# Patient Record
Sex: FEMALE | Race: WHITE | HISPANIC OR LATINO | Employment: FULL TIME | URBAN - METROPOLITAN AREA
[De-identification: names, ages, dates, MRNs, and addresses within clinical notes are randomized per-mention and may not be internally consistent; named-entity substitution may affect disease eponyms.]

---

## 2017-01-10 ENCOUNTER — ALLSCRIPTS OFFICE VISIT (OUTPATIENT)
Dept: OTHER | Facility: OTHER | Age: 42
End: 2017-01-10

## 2017-04-11 ENCOUNTER — ALLSCRIPTS OFFICE VISIT (OUTPATIENT)
Dept: OTHER | Facility: OTHER | Age: 42
End: 2017-04-11

## 2017-04-11 DIAGNOSIS — R10.2 PELVIC AND PERINEAL PAIN: ICD-10-CM

## 2017-04-18 ENCOUNTER — HOSPITAL ENCOUNTER (OUTPATIENT)
Dept: ULTRASOUND IMAGING | Facility: HOSPITAL | Age: 42
Discharge: HOME | End: 2017-04-18
Attending: OBSTETRICS & GYNECOLOGY
Payer: COMMERCIAL

## 2017-04-18 DIAGNOSIS — R10.2 PELVIC AND PERINEAL PAIN: ICD-10-CM

## 2017-04-18 PROCEDURE — 76856 US EXAM PELVIC COMPLETE: CPT

## 2017-04-18 PROCEDURE — 76830 TRANSVAGINAL US NON-OB: CPT

## 2017-04-25 ENCOUNTER — ALLSCRIPTS OFFICE VISIT (OUTPATIENT)
Dept: OTHER | Facility: OTHER | Age: 42
End: 2017-04-25

## 2017-06-09 ENCOUNTER — ANESTHESIA EVENT (OUTPATIENT)
Dept: PERIOP | Facility: HOSPITAL | Age: 42
End: 2017-06-09
Payer: COMMERCIAL

## 2017-06-09 RX ORDER — SODIUM CHLORIDE 9 MG/ML
125 INJECTION, SOLUTION INTRAVENOUS CONTINUOUS
Status: CANCELLED | OUTPATIENT
Start: 2017-06-20

## 2017-06-12 ENCOUNTER — APPOINTMENT (OUTPATIENT)
Dept: PREADMISSION TESTING | Facility: HOSPITAL | Age: 42
End: 2017-06-12
Payer: COMMERCIAL

## 2017-06-12 ENCOUNTER — APPOINTMENT (OUTPATIENT)
Dept: LAB | Facility: HOSPITAL | Age: 42
End: 2017-06-12
Attending: OBSTETRICS & GYNECOLOGY
Payer: COMMERCIAL

## 2017-06-12 ENCOUNTER — HOSPITAL ENCOUNTER (OUTPATIENT)
Dept: NON INVASIVE DIAGNOSTICS | Facility: HOSPITAL | Age: 42
Discharge: HOME | End: 2017-06-12
Attending: OBSTETRICS & GYNECOLOGY
Payer: COMMERCIAL

## 2017-06-12 ENCOUNTER — TRANSCRIBE ORDERS (OUTPATIENT)
Dept: ADMINISTRATIVE | Facility: HOSPITAL | Age: 42
End: 2017-06-12

## 2017-06-12 VITALS
HEART RATE: 89 BPM | DIASTOLIC BLOOD PRESSURE: 80 MMHG | BODY MASS INDEX: 31.54 KG/M2 | RESPIRATION RATE: 16 BRPM | SYSTOLIC BLOOD PRESSURE: 120 MMHG | HEIGHT: 63 IN | WEIGHT: 178 LBS | TEMPERATURE: 98.1 F

## 2017-06-12 DIAGNOSIS — Z01.818 PREOP EXAMINATION: Primary | ICD-10-CM

## 2017-06-12 DIAGNOSIS — N80.0 ENDOMETRIOSIS OF UTERUS: ICD-10-CM

## 2017-06-12 DIAGNOSIS — R10.2 ADNEXAL TENDERNESS, RIGHT: ICD-10-CM

## 2017-06-12 DIAGNOSIS — Z01.818 PREOP EXAMINATION: ICD-10-CM

## 2017-06-12 LAB
ABO GROUP BLD: NORMAL
ATRIAL RATE: 79 BPM
BASOPHILS # BLD AUTO: 0.12 THOUSANDS/ΜL (ref 0–0.1)
BASOPHILS NFR BLD AUTO: 1 % (ref 0–1)
BLD GP AB SCN SERPL QL: NEGATIVE
EOSINOPHIL # BLD AUTO: 0.23 THOUSAND/ΜL (ref 0–0.61)
EOSINOPHIL NFR BLD AUTO: 2 % (ref 0–6)
ERYTHROCYTE [DISTWIDTH] IN BLOOD BY AUTOMATED COUNT: 13.8 % (ref 11.6–15.1)
HCT VFR BLD AUTO: 41.3 % (ref 34.8–46.1)
HGB BLD-MCNC: 13.2 G/DL (ref 11.5–15.4)
LYMPHOCYTES # BLD AUTO: 2.41 THOUSANDS/ΜL (ref 0.6–4.47)
LYMPHOCYTES NFR BLD AUTO: 20 % (ref 14–44)
MCH RBC QN AUTO: 27.7 PG (ref 26.8–34.3)
MCHC RBC AUTO-ENTMCNC: 32 G/DL (ref 31.4–37.4)
MCV RBC AUTO: 87 FL (ref 82–98)
MONOCYTES # BLD AUTO: 0.95 THOUSAND/ΜL (ref 0.17–1.22)
MONOCYTES NFR BLD AUTO: 8 % (ref 4–12)
NEUTROPHILS # BLD AUTO: 8.25 THOUSANDS/ΜL (ref 1.85–7.62)
NEUTS SEG NFR BLD AUTO: 69 % (ref 43–75)
P AXIS: 49 DEGREES
PLATELET # BLD AUTO: 286 THOUSANDS/UL (ref 149–390)
PMV BLD AUTO: 12.4 FL (ref 8.9–12.7)
PR INTERVAL: 130 MS
QRS AXIS: 5 DEGREES
QRSD INTERVAL: 76 MS
QT INTERVAL: 374 MS
QTC INTERVAL: 428 MS
RBC # BLD AUTO: 4.76 MILLION/UL (ref 3.81–5.12)
RH BLD: POSITIVE
SPECIMEN EXPIRATION DATE: NORMAL
T WAVE AXIS: 2 DEGREES
VENTRICULAR RATE: 79 BPM
WBC # BLD AUTO: 11.96 THOUSAND/UL (ref 4.31–10.16)

## 2017-06-12 PROCEDURE — 86850 RBC ANTIBODY SCREEN: CPT

## 2017-06-12 PROCEDURE — 86900 BLOOD TYPING SEROLOGIC ABO: CPT

## 2017-06-12 PROCEDURE — 86901 BLOOD TYPING SEROLOGIC RH(D): CPT

## 2017-06-12 PROCEDURE — 93005 ELECTROCARDIOGRAM TRACING: CPT

## 2017-06-12 PROCEDURE — 85025 COMPLETE CBC W/AUTO DIFF WBC: CPT

## 2017-06-12 PROCEDURE — 36415 COLL VENOUS BLD VENIPUNCTURE: CPT

## 2017-06-12 RX ORDER — ASPIRIN 81 MG/1
81 TABLET ORAL DAILY
COMMUNITY
End: 2017-06-21 | Stop reason: HOSPADM

## 2017-06-12 RX ORDER — LOSARTAN POTASSIUM 25 MG/1
25 TABLET ORAL EVERY EVENING
COMMUNITY
End: 2021-06-21 | Stop reason: CLARIF

## 2017-06-12 RX ORDER — GLIPIZIDE 5 MG/1
5 TABLET ORAL DAILY
COMMUNITY
End: 2021-03-29 | Stop reason: ALTCHOICE

## 2017-06-20 ENCOUNTER — HOSPITAL ENCOUNTER (OUTPATIENT)
Facility: HOSPITAL | Age: 42
Setting detail: OUTPATIENT SURGERY
Discharge: HOME | End: 2017-06-21
Attending: OBSTETRICS & GYNECOLOGY | Admitting: OBSTETRICS & GYNECOLOGY
Payer: COMMERCIAL

## 2017-06-20 ENCOUNTER — ANESTHESIA (OUTPATIENT)
Dept: PERIOP | Facility: HOSPITAL | Age: 42
End: 2017-06-20
Payer: COMMERCIAL

## 2017-06-20 DIAGNOSIS — N80.0 ENDOMETRIOSIS OF UTERUS: ICD-10-CM

## 2017-06-20 DIAGNOSIS — R10.2 PELVIC AND PERINEAL PAIN: ICD-10-CM

## 2017-06-20 PROBLEM — Z90.710 S/P LAPAROSCOPIC HYSTERECTOMY: Status: ACTIVE | Noted: 2017-06-20

## 2017-06-20 LAB
EXT PREGNANCY TEST URINE: NEGATIVE
GLUCOSE SERPL-MCNC: 154 MG/DL (ref 65–140)
GLUCOSE SERPL-MCNC: 200 MG/DL (ref 65–140)

## 2017-06-20 PROCEDURE — 81025 URINE PREGNANCY TEST: CPT | Performed by: ANESTHESIOLOGY

## 2017-06-20 PROCEDURE — 88307 TISSUE EXAM BY PATHOLOGIST: CPT | Performed by: OBSTETRICS & GYNECOLOGY

## 2017-06-20 PROCEDURE — 82948 REAGENT STRIP/BLOOD GLUCOSE: CPT

## 2017-06-20 RX ORDER — SODIUM CHLORIDE, SODIUM LACTATE, POTASSIUM CHLORIDE, CALCIUM CHLORIDE 600; 310; 30; 20 MG/100ML; MG/100ML; MG/100ML; MG/100ML
125 INJECTION, SOLUTION INTRAVENOUS CONTINUOUS
Status: DISCONTINUED | OUTPATIENT
Start: 2017-06-20 | End: 2017-06-20 | Stop reason: HOSPADM

## 2017-06-20 RX ORDER — ONDANSETRON 2 MG/ML
4 INJECTION INTRAMUSCULAR; INTRAVENOUS ONCE AS NEEDED
Status: DISCONTINUED | OUTPATIENT
Start: 2017-06-20 | End: 2017-06-20 | Stop reason: HOSPADM

## 2017-06-20 RX ORDER — ACETAMINOPHEN 325 MG/1
650 TABLET ORAL EVERY 4 HOURS PRN
Status: DISCONTINUED | OUTPATIENT
Start: 2017-06-20 | End: 2017-06-21 | Stop reason: HOSPADM

## 2017-06-20 RX ORDER — SODIUM CHLORIDE 9 MG/ML
125 INJECTION, SOLUTION INTRAVENOUS CONTINUOUS
Status: DISCONTINUED | OUTPATIENT
Start: 2017-06-20 | End: 2017-06-20 | Stop reason: HOSPADM

## 2017-06-20 RX ORDER — ONDANSETRON 2 MG/ML
INJECTION INTRAMUSCULAR; INTRAVENOUS AS NEEDED
Status: DISCONTINUED | OUTPATIENT
Start: 2017-06-20 | End: 2017-06-20 | Stop reason: SURG

## 2017-06-20 RX ORDER — LOSARTAN POTASSIUM 50 MG/1
25 TABLET ORAL EVERY EVENING
Status: DISCONTINUED | OUTPATIENT
Start: 2017-06-20 | End: 2017-06-21 | Stop reason: HOSPADM

## 2017-06-20 RX ORDER — ROCURONIUM BROMIDE 10 MG/ML
INJECTION, SOLUTION INTRAVENOUS AS NEEDED
Status: DISCONTINUED | OUTPATIENT
Start: 2017-06-20 | End: 2017-06-20 | Stop reason: SURG

## 2017-06-20 RX ORDER — BUPIVACAINE HYDROCHLORIDE 5 MG/ML
INJECTION, SOLUTION PERINEURAL AS NEEDED
Status: DISCONTINUED | OUTPATIENT
Start: 2017-06-20 | End: 2017-06-20 | Stop reason: HOSPADM

## 2017-06-20 RX ORDER — MIDAZOLAM HYDROCHLORIDE 1 MG/ML
INJECTION INTRAMUSCULAR; INTRAVENOUS AS NEEDED
Status: DISCONTINUED | OUTPATIENT
Start: 2017-06-20 | End: 2017-06-20 | Stop reason: SURG

## 2017-06-20 RX ORDER — ASPIRIN 81 MG/1
81 TABLET ORAL DAILY
Status: DISCONTINUED | OUTPATIENT
Start: 2017-06-21 | End: 2017-06-21 | Stop reason: HOSPADM

## 2017-06-20 RX ORDER — IBUPROFEN 600 MG/1
600 TABLET ORAL EVERY 6 HOURS PRN
Status: DISCONTINUED | OUTPATIENT
Start: 2017-06-20 | End: 2017-06-21 | Stop reason: HOSPADM

## 2017-06-20 RX ORDER — FENTANYL CITRATE 50 UG/ML
INJECTION, SOLUTION INTRAMUSCULAR; INTRAVENOUS AS NEEDED
Status: DISCONTINUED | OUTPATIENT
Start: 2017-06-20 | End: 2017-06-20 | Stop reason: SURG

## 2017-06-20 RX ORDER — DOCUSATE SODIUM 100 MG/1
100 CAPSULE, LIQUID FILLED ORAL 2 TIMES DAILY
Status: DISCONTINUED | OUTPATIENT
Start: 2017-06-20 | End: 2017-06-21 | Stop reason: HOSPADM

## 2017-06-20 RX ORDER — PROPOFOL 10 MG/ML
INJECTION, EMULSION INTRAVENOUS AS NEEDED
Status: DISCONTINUED | OUTPATIENT
Start: 2017-06-20 | End: 2017-06-20 | Stop reason: SURG

## 2017-06-20 RX ORDER — ONDANSETRON 2 MG/ML
4 INJECTION INTRAMUSCULAR; INTRAVENOUS EVERY 6 HOURS PRN
Status: DISCONTINUED | OUTPATIENT
Start: 2017-06-20 | End: 2017-06-21 | Stop reason: HOSPADM

## 2017-06-20 RX ORDER — HYDROMORPHONE HYDROCHLORIDE 2 MG/ML
INJECTION, SOLUTION INTRAMUSCULAR; INTRAVENOUS; SUBCUTANEOUS AS NEEDED
Status: DISCONTINUED | OUTPATIENT
Start: 2017-06-20 | End: 2017-06-20 | Stop reason: SURG

## 2017-06-20 RX ORDER — OXYCODONE HYDROCHLORIDE AND ACETAMINOPHEN 5; 325 MG/1; MG/1
1 TABLET ORAL EVERY 4 HOURS PRN
Status: DISCONTINUED | OUTPATIENT
Start: 2017-06-20 | End: 2017-06-21 | Stop reason: HOSPADM

## 2017-06-20 RX ORDER — MAGNESIUM HYDROXIDE 1200 MG/15ML
LIQUID ORAL AS NEEDED
Status: DISCONTINUED | OUTPATIENT
Start: 2017-06-20 | End: 2017-06-20 | Stop reason: HOSPADM

## 2017-06-20 RX ORDER — GLIPIZIDE 5 MG/1
5 TABLET ORAL DAILY
Status: DISCONTINUED | OUTPATIENT
Start: 2017-06-21 | End: 2017-06-21 | Stop reason: HOSPADM

## 2017-06-20 RX ORDER — OXYCODONE HYDROCHLORIDE AND ACETAMINOPHEN 5; 325 MG/1; MG/1
2 TABLET ORAL EVERY 4 HOURS PRN
Status: DISCONTINUED | OUTPATIENT
Start: 2017-06-20 | End: 2017-06-21 | Stop reason: HOSPADM

## 2017-06-20 RX ORDER — SODIUM CHLORIDE 9 MG/ML
125 INJECTION, SOLUTION INTRAVENOUS CONTINUOUS
Status: DISCONTINUED | OUTPATIENT
Start: 2017-06-20 | End: 2017-06-21 | Stop reason: HOSPADM

## 2017-06-20 RX ADMIN — FENTANYL CITRATE 50 MCG: 50 INJECTION, SOLUTION INTRAMUSCULAR; INTRAVENOUS at 14:00

## 2017-06-20 RX ADMIN — ROCURONIUM BROMIDE 50 MG: 10 INJECTION, SOLUTION INTRAVENOUS at 12:28

## 2017-06-20 RX ADMIN — INSULIN LISPRO 1 UNITS: 100 INJECTION, SOLUTION INTRAVENOUS; SUBCUTANEOUS at 17:51

## 2017-06-20 RX ADMIN — ONDANSETRON HYDROCHLORIDE 4 MG: 2 INJECTION, SOLUTION INTRAVENOUS at 12:52

## 2017-06-20 RX ADMIN — CEFAZOLIN SODIUM 2000 MG: 2 SOLUTION INTRAVENOUS at 12:37

## 2017-06-20 RX ADMIN — METHYLENE BLUE 5 MG: 5 INJECTION INTRAVENOUS at 14:52

## 2017-06-20 RX ADMIN — HYDROMORPHONE HYDROCHLORIDE 0.5 MG: 2 INJECTION, SOLUTION INTRAMUSCULAR; INTRAVENOUS; SUBCUTANEOUS at 13:10

## 2017-06-20 RX ADMIN — FENTANYL CITRATE 50 MCG: 50 INJECTION, SOLUTION INTRAMUSCULAR; INTRAVENOUS at 12:52

## 2017-06-20 RX ADMIN — ONDANSETRON HYDROCHLORIDE 4 MG: 2 INJECTION, SOLUTION INTRAVENOUS at 13:19

## 2017-06-20 RX ADMIN — FENTANYL CITRATE 50 MCG: 50 INJECTION, SOLUTION INTRAMUSCULAR; INTRAVENOUS at 15:10

## 2017-06-20 RX ADMIN — FENTANYL CITRATE 50 MCG: 50 INJECTION, SOLUTION INTRAMUSCULAR; INTRAVENOUS at 13:00

## 2017-06-20 RX ADMIN — DOCUSATE SODIUM 100 MG: 100 CAPSULE, LIQUID FILLED ORAL at 17:51

## 2017-06-20 RX ADMIN — ONDANSETRON 4 MG: 2 INJECTION INTRAMUSCULAR; INTRAVENOUS at 17:51

## 2017-06-20 RX ADMIN — DEXAMETHASONE SODIUM PHOSPHATE 4 MG: 10 INJECTION INTRAMUSCULAR; INTRAVENOUS at 12:52

## 2017-06-20 RX ADMIN — DEXAMETHASONE SODIUM PHOSPHATE 4 MG: 10 INJECTION INTRAMUSCULAR; INTRAVENOUS at 13:06

## 2017-06-20 RX ADMIN — SODIUM CHLORIDE 125 ML/HR: 0.9 INJECTION, SOLUTION INTRAVENOUS at 16:41

## 2017-06-20 RX ADMIN — HYDROMORPHONE HYDROCHLORIDE 0.5 MG: 2 INJECTION, SOLUTION INTRAMUSCULAR; INTRAVENOUS; SUBCUTANEOUS at 13:04

## 2017-06-20 RX ADMIN — HYDROMORPHONE HYDROCHLORIDE 0.5 MG: 2 INJECTION, SOLUTION INTRAMUSCULAR; INTRAVENOUS; SUBCUTANEOUS at 13:42

## 2017-06-20 RX ADMIN — SODIUM CHLORIDE 125 ML/HR: 0.9 INJECTION, SOLUTION INTRAVENOUS at 11:58

## 2017-06-20 RX ADMIN — HYDROMORPHONE HYDROCHLORIDE 0.5 MG: 2 INJECTION, SOLUTION INTRAMUSCULAR; INTRAVENOUS; SUBCUTANEOUS at 13:23

## 2017-06-20 RX ADMIN — FENTANYL CITRATE 100 MCG: 50 INJECTION, SOLUTION INTRAMUSCULAR; INTRAVENOUS at 12:28

## 2017-06-20 RX ADMIN — OXYCODONE HYDROCHLORIDE AND ACETAMINOPHEN 2 TABLET: 5; 325 TABLET ORAL at 17:51

## 2017-06-20 RX ADMIN — MIDAZOLAM HYDROCHLORIDE 2 MG: 1 INJECTION, SOLUTION INTRAMUSCULAR; INTRAVENOUS at 12:20

## 2017-06-20 RX ADMIN — PROPOFOL 200 MG: 10 INJECTION, EMULSION INTRAVENOUS at 12:28

## 2017-06-20 RX ADMIN — LOSARTAN POTASSIUM 25 MG: 50 TABLET, FILM COATED ORAL at 17:50

## 2017-06-21 VITALS
OXYGEN SATURATION: 99 % | BODY MASS INDEX: 31.54 KG/M2 | HEART RATE: 80 BPM | RESPIRATION RATE: 16 BRPM | HEIGHT: 63 IN | SYSTOLIC BLOOD PRESSURE: 101 MMHG | DIASTOLIC BLOOD PRESSURE: 68 MMHG | WEIGHT: 178 LBS | TEMPERATURE: 98.9 F

## 2017-06-21 LAB
ERYTHROCYTE [DISTWIDTH] IN BLOOD BY AUTOMATED COUNT: 13.6 % (ref 11.6–15.1)
GLUCOSE SERPL-MCNC: 159 MG/DL (ref 65–140)
GLUCOSE SERPL-MCNC: 196 MG/DL (ref 65–140)
HCT VFR BLD AUTO: 39 % (ref 34.8–46.1)
HGB BLD-MCNC: 12.8 G/DL (ref 11.5–15.4)
MCH RBC QN AUTO: 28.6 PG (ref 26.8–34.3)
MCHC RBC AUTO-ENTMCNC: 32.8 G/DL (ref 31.4–37.4)
MCV RBC AUTO: 87 FL (ref 82–98)
PLATELET # BLD AUTO: 318 THOUSANDS/UL (ref 149–390)
PMV BLD AUTO: 12 FL (ref 8.9–12.7)
RBC # BLD AUTO: 4.48 MILLION/UL (ref 3.81–5.12)
WBC # BLD AUTO: 17.05 THOUSAND/UL (ref 4.31–10.16)

## 2017-06-21 PROCEDURE — 82948 REAGENT STRIP/BLOOD GLUCOSE: CPT

## 2017-06-21 PROCEDURE — 85027 COMPLETE CBC AUTOMATED: CPT | Performed by: OBSTETRICS & GYNECOLOGY

## 2017-06-21 RX ORDER — OXYCODONE HYDROCHLORIDE AND ACETAMINOPHEN 5; 325 MG/1; MG/1
1 TABLET ORAL EVERY 4 HOURS PRN
Refills: 0
Start: 2017-06-21 | End: 2017-07-01

## 2017-06-21 RX ORDER — IBUPROFEN 600 MG/1
600 TABLET ORAL EVERY 6 HOURS PRN
Qty: 30 TABLET | Refills: 0
Start: 2017-06-21

## 2017-06-21 RX ADMIN — GLIPIZIDE 5 MG: 5 TABLET ORAL at 09:05

## 2017-06-21 RX ADMIN — INSULIN LISPRO 1 UNITS: 100 INJECTION, SOLUTION INTRAVENOUS; SUBCUTANEOUS at 12:27

## 2017-06-21 RX ADMIN — ACETAMINOPHEN 650 MG: 325 TABLET, FILM COATED ORAL at 00:39

## 2017-06-21 RX ADMIN — OXYCODONE HYDROCHLORIDE AND ACETAMINOPHEN 2 TABLET: 5; 325 TABLET ORAL at 09:05

## 2017-06-21 RX ADMIN — SODIUM CHLORIDE 125 ML/HR: 0.9 INJECTION, SOLUTION INTRAVENOUS at 00:34

## 2017-06-21 RX ADMIN — METFORMIN HYDROCHLORIDE 1000 MG: 500 TABLET, FILM COATED ORAL at 09:05

## 2017-06-21 RX ADMIN — ASPIRIN 81 MG: 81 TABLET, COATED ORAL at 09:05

## 2017-06-21 RX ADMIN — IBUPROFEN 600 MG: 600 TABLET, FILM COATED ORAL at 12:27

## 2017-06-21 RX ADMIN — INSULIN LISPRO 1 UNITS: 100 INJECTION, SOLUTION INTRAVENOUS; SUBCUTANEOUS at 07:21

## 2017-06-21 RX ADMIN — DOCUSATE SODIUM 100 MG: 100 CAPSULE, LIQUID FILLED ORAL at 09:05

## 2017-07-03 ENCOUNTER — ALLSCRIPTS OFFICE VISIT (OUTPATIENT)
Dept: OTHER | Facility: OTHER | Age: 42
End: 2017-07-03

## 2017-07-26 ENCOUNTER — ALLSCRIPTS OFFICE VISIT (OUTPATIENT)
Dept: OTHER | Facility: OTHER | Age: 42
End: 2017-07-26

## 2017-08-09 ENCOUNTER — ALLSCRIPTS OFFICE VISIT (OUTPATIENT)
Dept: OTHER | Facility: OTHER | Age: 42
End: 2017-08-09

## 2017-08-22 ENCOUNTER — ALLSCRIPTS OFFICE VISIT (OUTPATIENT)
Dept: OTHER | Facility: OTHER | Age: 42
End: 2017-08-22

## 2017-09-11 ENCOUNTER — GENERIC CONVERSION - ENCOUNTER (OUTPATIENT)
Dept: OTHER | Facility: OTHER | Age: 42
End: 2017-09-11

## 2017-09-11 DIAGNOSIS — Z12.31 ENCOUNTER FOR SCREENING MAMMOGRAM FOR MALIGNANT NEOPLASM OF BREAST: ICD-10-CM

## 2017-11-03 ENCOUNTER — HOSPITAL ENCOUNTER (OUTPATIENT)
Dept: MAMMOGRAPHY | Facility: HOSPITAL | Age: 42
Discharge: HOME | End: 2017-11-03
Attending: OBSTETRICS & GYNECOLOGY
Payer: COMMERCIAL

## 2017-11-03 DIAGNOSIS — Z12.31 ENCOUNTER FOR SCREENING MAMMOGRAM FOR MALIGNANT NEOPLASM OF BREAST: ICD-10-CM

## 2017-11-03 PROCEDURE — G0202 SCR MAMMO BI INCL CAD: HCPCS

## 2018-01-12 VITALS
HEIGHT: 63 IN | SYSTOLIC BLOOD PRESSURE: 117 MMHG | WEIGHT: 175 LBS | DIASTOLIC BLOOD PRESSURE: 75 MMHG | BODY MASS INDEX: 31.01 KG/M2

## 2018-01-14 VITALS
DIASTOLIC BLOOD PRESSURE: 70 MMHG | WEIGHT: 179 LBS | BODY MASS INDEX: 31.71 KG/M2 | SYSTOLIC BLOOD PRESSURE: 117 MMHG | HEIGHT: 63 IN

## 2018-01-14 VITALS
BODY MASS INDEX: 31.71 KG/M2 | SYSTOLIC BLOOD PRESSURE: 117 MMHG | DIASTOLIC BLOOD PRESSURE: 70 MMHG | WEIGHT: 179 LBS | HEIGHT: 63 IN

## 2018-01-14 VITALS
BODY MASS INDEX: 31.76 KG/M2 | SYSTOLIC BLOOD PRESSURE: 120 MMHG | WEIGHT: 179.25 LBS | HEIGHT: 63 IN | DIASTOLIC BLOOD PRESSURE: 88 MMHG

## 2018-01-14 VITALS
SYSTOLIC BLOOD PRESSURE: 112 MMHG | DIASTOLIC BLOOD PRESSURE: 70 MMHG | WEIGHT: 179 LBS | HEIGHT: 63 IN | BODY MASS INDEX: 31.71 KG/M2

## 2018-01-15 VITALS
BODY MASS INDEX: 31.36 KG/M2 | DIASTOLIC BLOOD PRESSURE: 72 MMHG | SYSTOLIC BLOOD PRESSURE: 110 MMHG | WEIGHT: 177 LBS | HEIGHT: 63 IN

## 2018-01-15 VITALS
SYSTOLIC BLOOD PRESSURE: 116 MMHG | HEIGHT: 63 IN | WEIGHT: 177.38 LBS | DIASTOLIC BLOOD PRESSURE: 68 MMHG | BODY MASS INDEX: 31.43 KG/M2

## 2018-01-22 VITALS
HEIGHT: 63 IN | BODY MASS INDEX: 31.43 KG/M2 | SYSTOLIC BLOOD PRESSURE: 124 MMHG | DIASTOLIC BLOOD PRESSURE: 74 MMHG | WEIGHT: 177.38 LBS

## 2018-09-20 ENCOUNTER — ANNUAL EXAM (OUTPATIENT)
Dept: OBGYN CLINIC | Facility: CLINIC | Age: 43
End: 2018-09-20
Payer: COMMERCIAL

## 2018-09-20 VITALS
HEIGHT: 63 IN | BODY MASS INDEX: 29.66 KG/M2 | SYSTOLIC BLOOD PRESSURE: 120 MMHG | WEIGHT: 167.4 LBS | DIASTOLIC BLOOD PRESSURE: 80 MMHG

## 2018-09-20 DIAGNOSIS — Z01.411 ENCNTR FOR GYN EXAM (GENERAL) (ROUTINE) W ABNORMAL FINDINGS: Primary | ICD-10-CM

## 2018-09-20 DIAGNOSIS — Z12.31 ENCOUNTER FOR SCREENING MAMMOGRAM FOR BREAST CANCER: ICD-10-CM

## 2018-09-20 PROCEDURE — S0612 ANNUAL GYNECOLOGICAL EXAMINA: HCPCS | Performed by: OBSTETRICS & GYNECOLOGY

## 2018-09-20 RX ORDER — INSULIN GLARGINE 100 [IU]/ML
20 INJECTION, SOLUTION SUBCUTANEOUS DAILY
COMMUNITY
Start: 2018-08-08 | End: 2021-08-18 | Stop reason: SDUPTHER

## 2018-09-20 NOTE — PATIENT INSTRUCTIONS
Breast Self Exam for Women   WHAT YOU NEED TO KNOW:   A BSE is a way to check your breasts for lumps and other changes  Regular BSEs can help you know how your breasts normally look and feel  Most breast lumps or changes are not cancer, but you should always have them checked by a healthcare provider  Your healthcare provider can also watch you do a BSE and can tell you if you are doing your BSE correctly  DISCHARGE INSTRUCTIONS:   Contact your healthcare provider if:   · You find any lumps or changes in your breasts  · You have breast pain or fluid coming from your nipples  · You have questions or concerns about your condition or care  Why you should do a BSE:  Breast cancer is the most common type of cancer in women  Even if you have mammograms, you may still want to do a BSE regularly  If you know how your breasts normally feel and look, it may help you know when to contact your healthcare provider  Mammograms can miss some cancers  You may find a lump during a BSE that did not show up on your mammogram   When you should do a BSE:  [employer] Fails your calendar to help you remember to do BSE on a regular schedule  One easy way to remember to do a BSE is to do the exam on the same day of each month  If you have periods, you may want to do your BSE 1 week after your period ends  This is the time when your breasts may be the least swollen, lumpy, or tender  You can do regular BSEs even if you are breastfeeding or have breast implants  How to do a BSE:   · Look at your breasts in a mirror  Look at the size and shape of each breast and nipple  Check for swelling, lumps, dimpling, scaly skin, or other skin changes  Look for nipple changes, such as a nipple that is painful or beginning to pull inward  Gently squeeze both nipples and check to see if fluid (that is not breast milk) comes out of them  If you find any of these or other breast changes, contact your healthcare provider   Check your breasts while you sit or  the following 3 positions:    [facility] your arms down at your sides  ¨ Raise your hands and join them behind your head  ¨ Put firm pressure with your hands on your hips  Bend slightly forward while you look at your breasts in the mirror  · Lie down and feel your breasts  When you lie down, your breast tissue spreads out evenly over your chest  This makes it easier for you to feel for lumps and anything that may not be normal for your breasts  Do a BSE on one breast at a time  ¨ Place a small pillow or towel under your left shoulder  Put your left arm behind your head  ¨ Use the 3 middle fingers of your right hand  Use your fingertip pads, on the top of your fingers  Your fingertip pad is the most sensitive part of your finger  ¨ Use small circles to feel your breast tissue  Use your fingertip pads to make dime-sized, overlapping circles on your breast and armpits  Use light, medium, and firm pressure  First, press lightly  Second, press with medium pressure to feel a little deeper into the breast  Last, use firm pressure to feel deep within your breast     ¨ Examine your entire breast area  Examine the breast area from above the breast to below the breast where you feel only ribs  Make small circles with your fingertips, starting in the middle of your armpit  Make circles going up and down the breast area  Continue toward your breast and all the way across it  Examine the area from your armpit all the way over to the middle of your chest (breastbone)  Stop at the middle of your chest     ¨ Move the pillow or towel to your right shoulder, and put your right arm behind your head  Use the 3 fingertip pads of your left hand, and repeat the above steps to do a BSE on your right breast        What else you can do to check for breast problems or cancer:  Some experts suggest that women 36years of age or older should have a mammogram every year   Other experts suggest that women between the ages of 48and 76years old should have a mammogram every 2 years  Talk to your healthcare provider about when you should have a mammogram   Follow up with your healthcare provider as directed: Your healthcare provider can watch you and tell you if you are doing your BSE correctly  Write down your questions so you remember to ask them during your visits  © 2017 [employer] Information is for End User's use only and may not be sold, redistributed or otherwise used for commercial purposes  All illustrations and images included in CareNotes® are the copyrighted property of A D A M , Inc  or Leonel Dean  The above information is an  only  It is not intended as medical advice for individual conditions or treatments  Talk to your doctor, nurse or pharmacist before following any medical regimen to see if it is safe and effective for you  Wellness Visit for Adults   AMBULATORY CARE:   A wellness visit  is when you see your healthcare provider to get screened for health problems  You can also get advice on how to stay healthy  Write down your questions so you remember to ask them  Ask your healthcare provider how often you should have a wellness visit  What happens at a wellness visit:  Your healthcare provider will ask about your health, and your family history of health problems  This includes high blood pressure, heart disease, and cancer  He or she will ask if you have symptoms that concern you, if you smoke, and about your mood  You may also be asked about your intake of medicines, supplements, food, and alcohol  Any of the following may be done:  · Your weight  will be checked  Your height may also be checked so your body mass index (BMI) can be calculated  Your BMI shows if you are at a healthy weight  · Your blood pressure  and heart rate will be checked  Your temperature may also be checked  · Blood and urine tests  may be done   Blood tests may be done to check your cholesterol levels  Abnormal cholesterol levels increase your risk for heart disease and stroke  You may also need a blood or urine test to check for diabetes if you are at increased risk  Urine tests may be done to look for signs of an infection or kidney disease  · A physical exam  includes checking your heartbeat and lungs with a stethoscope  Your healthcare provider may also check your skin to look for sun damage  · Screening tests  may be recommended  A screening test is done to check for diseases that may not cause symptoms  The screening tests you may need depend on your age, gender, family history, and lifestyle habits  For example, colorectal screening may be recommended if you are 48years old or older  Screening tests you need if you are a woman:   · A Pap smear  is used to screen for cervical cancer  Pap smears are usually done every 3 to 5 years depending on your age  You may need them more often if you have had abnormal Pap smear test results in the past  Ask your healthcare provider how often you should have a Pap smear  · A mammogram  is an x-ray of your breasts to screen for breast cancer  Experts recommend mammograms every 2 years starting at age 48 years  You may need a mammogram at age 52 years or younger if you have an increased risk for breast cancer  Talk to your healthcare provider about when you should start having mammograms and how often you need them  Vaccines you may need:   · Get an influenza vaccine  every year  The influenza vaccine protects you from the flu  Several types of viruses cause the flu  The viruses change over time, so new vaccines are made each year  · Get a tetanus-diphtheria (Td) booster vaccine  every 10 years  This vaccine protects you against tetanus and diphtheria  Tetanus is a severe infection that may cause painful muscle spasms and lockjaw   Diphtheria is a severe bacterial infection that causes a thick covering in the back of your mouth and throat  · Get a human papillomavirus (HPV) vaccine  if you are female and aged 23 to 32 or male 23 to 24 and never received it  This vaccine protects you from HPV infection  HPV is the most common infection spread by sexual contact  HPV may also cause vaginal, penile, and anal cancers  · Get a pneumococcal vaccine  if you are aged 72 years or older  The pneumococcal vaccine is an injection given to protect you from pneumococcal disease  Pneumococcal disease is an infection caused by pneumococcal bacteria  The infection may cause pneumonia, meningitis, or an ear infection  · Get a shingles vaccine  if you are aged 61 or older, even if you have had shingles before  The shingles vaccine is an injection to protect you from the varicella-zoster virus  This is the same virus that causes chickenpox  Shingles is a painful rash that develops in people who had chickenpox or have been exposed to the virus  How to eat healthy:  My Plate is a model for planning healthy meals  It shows the types and amounts of foods that should go on your plate  Fruits and vegetables make up about half of your plate, and grains and protein make up the other half  A serving of dairy is included on the side of your plate  The amount of calories and serving sizes you need depends on your age, gender, weight, and height  Examples of healthy foods are listed below:  · Eat a variety of vegetables  such as dark green, red, and orange vegetables  You can also include canned vegetables low in sodium (salt) and frozen vegetables without added butter or sauces  · Eat a variety of fresh fruits , canned fruit in 100% juice, frozen fruit, and dried fruit  · Include whole grains  At least half of the grains you eat should be whole grains   Examples include whole-wheat bread, wheat pasta, brown rice, and whole-grain cereals such as oatmeal     · Eat a variety of protein foods such as seafood (fish and shellfish), lean meat, and poultry without skin (turkey and chicken)  Examples of lean meats include pork leg, shoulder, or tenderloin, and beef round, sirloin, tenderloin, and extra lean ground beef  Other protein foods include eggs and egg substitutes, beans, peas, soy products, nuts, and seeds  · Choose low-fat dairy products such as skim or 1% milk or low-fat yogurt, cheese, and cottage cheese  · Limit unhealthy fats  such as butter, hard margarine, and shortening  Exercise:  Exercise at least 30 minutes per day on most days of the week  Some examples of exercise include walking, biking, dancing, and swimming  You can also fit in more physical activity by taking the stairs instead of the elevator or parking farther away from stores  Include muscle strengthening activities 2 days each week  Regular exercise provides many health benefits  It helps you manage your weight, and decreases your risk for type 2 diabetes, heart disease, stroke, and high blood pressure  Exercise can also help improve your mood  Ask your healthcare provider about the best exercise plan for you  General health and safety guidelines:   · Do not smoke  Nicotine and other chemicals in cigarettes and cigars can cause lung damage  Ask your healthcare provider for information if you currently smoke and need help to quit  E-cigarettes or smokeless tobacco still contain nicotine  Talk to your healthcare provider before you use these products  · Limit alcohol  A drink of alcohol is 12 ounces of beer, 5 ounces of wine, or 1½ ounces of liquor  · Lose weight, if needed  Being overweight increases your risk of certain health conditions  These include heart disease, high blood pressure, type 2 diabetes, and certain types of cancer  · Protect your skin  Do not sunbathe or use tanning beds  Use sunscreen with a SPF 15 or higher  Apply sunscreen at least 15 minutes before you go outside  Reapply sunscreen every 2 hours   Wear protective clothing, hats, and sunglasses when you are outside  · Drive safely  Always wear your seatbelt  Make sure everyone in your car wears a seatbelt  A seatbelt can save your life if you are in an accident  Do not use your cell phone when you are driving  This could distract you and cause an accident  Pull over if you need to make a call or send a text message  · Practice safe sex  Use latex condoms if are sexually active and have more than one partner  Your healthcare provider may recommend screening tests for sexually transmitted infections (STIs)  · Wear helmets, lifejackets, and protective gear  Always wear a helmet when you ride a bike or motorcycle, go skiing, or play sports that could cause a head injury  Wear protective equipment when you play sports  Wear a lifejacket when you are on a boat or doing water sports  © 2017 [employer]  Information is for End User's use only and may not be sold, redistributed or otherwise used for commercial purposes  All illustrations and images included in CareNotes® are the copyrighted property of A [employer] A M , Inc  or Leonel Dean  The above information is an  only  It is not intended as medical advice for individual conditions or treatments  Talk to your doctor, nurse or pharmacist before following any medical regimen to see if it is safe and effective for you

## 2018-09-20 NOTE — PROGRESS NOTES
Assessment        Diagnoses and all orders for this visit:    Encntr for gyn exam (general) (routine) w abnormal findings    Encounter for screening mammogram for breast cancer  -     Mammo screening bilateral w cad; Future    Other orders  -     LANTUS SOLOSTAR 100 units/mL injection pen;                   Plan      Breast self exam technique reviewed and patient encouraged to perform self-exam monthly  Discussed healthy lifestyle modifications  Educational material distributed  Follow up in 1 year  Follow up as needed  Mammogram   PAP not indicated       Subjective      Jamel Salazar is a 37 y o  female who presents for annual exam     Patient with history of total laparoscopic hysterectomy and bilateral salpingectomy in 2017  Pathology was normal with no dysplasia or hyperplasia  This was done for abnormal uterine bleeding  Patient has no complaints today  Her last mammogram was done in 2017  Current contraception: tubal ligation  History of abnormal Pap smear: no      Menstrual History:  OB History      Para Term  AB Living    6 6 6 0 0 6    SAB TAB Ectopic Multiple Live Births    0 0 0 0 0           Patient's last menstrual period was 2016  The following portions of the patient's history were reviewed and updated as appropriate: allergies, current medications, past family history, past medical history, past social history, past surgical history and problem list         Review of Systems   Constitutional: Negative  HENT: Negative  Eyes: Negative  Respiratory: Negative  Cardiovascular: Negative  Gastrointestinal: Negative  Endocrine: Negative  Genitourinary:        As noted in HPI   Musculoskeletal: Negative  Skin: Negative  Allergic/Immunologic: Negative  Neurological: Negative  Hematological: Negative  Psychiatric/Behavioral: Negative  Physical Exam   Constitutional: She is oriented to person, place, and time  She appears well-developed and well-nourished  Genitourinary: There is no rash or lesion on the right labia  There is no rash or lesion on the left labia  No bleeding in the vagina  No vaginal discharge found  Right adnexum does not display mass, does not display tenderness and does not display fullness  Left adnexum does not display mass, does not display tenderness and does not display fullness  HENT:   Head: Normocephalic  Neck: No thyromegaly present  Cardiovascular: Normal rate, regular rhythm and normal heart sounds  No murmur heard  Pulmonary/Chest: Effort normal and breath sounds normal  No respiratory distress  She has no wheezes  She has no rales  Right breast exhibits no mass, no nipple discharge, no skin change and no tenderness  Left breast exhibits no mass, no nipple discharge, no skin change and no tenderness  Abdominal: Soft  She exhibits no distension and no mass  There is no tenderness  There is no rebound and no guarding  Musculoskeletal: She exhibits no edema or tenderness  Neurological: She is alert and oriented to person, place, and time  Skin: Skin is warm and dry  Psychiatric: She has a normal mood and affect

## 2018-11-14 ENCOUNTER — HOSPITAL ENCOUNTER (OUTPATIENT)
Dept: MAMMOGRAPHY | Facility: MEDICAL CENTER | Age: 43
Discharge: HOME | End: 2018-11-14
Payer: COMMERCIAL

## 2018-11-14 VITALS — WEIGHT: 167 LBS | HEIGHT: 65 IN | BODY MASS INDEX: 27.82 KG/M2

## 2018-11-14 DIAGNOSIS — Z12.31 ENCOUNTER FOR SCREENING MAMMOGRAM FOR BREAST CANCER: ICD-10-CM

## 2018-11-14 PROCEDURE — 77067 SCR MAMMO BI INCL CAD: CPT

## 2018-11-20 ENCOUNTER — TELEPHONE (OUTPATIENT)
Dept: OBGYN CLINIC | Facility: CLINIC | Age: 43
End: 2018-11-20

## 2018-11-20 NOTE — TELEPHONE ENCOUNTER
----- Message from Kaitlyn Fajardo MD sent at 11/15/2018  2:01 PM EST -----  Notify patient that her mammogram was normal

## 2020-01-15 ENCOUNTER — ANNUAL EXAM (OUTPATIENT)
Dept: OBGYN CLINIC | Facility: CLINIC | Age: 45
End: 2020-01-15
Payer: COMMERCIAL

## 2020-01-15 VITALS
BODY MASS INDEX: 28.7 KG/M2 | SYSTOLIC BLOOD PRESSURE: 122 MMHG | HEIGHT: 63 IN | WEIGHT: 162 LBS | DIASTOLIC BLOOD PRESSURE: 80 MMHG

## 2020-01-15 DIAGNOSIS — Z98.890 HISTORY OF ENDOMETRIAL ABLATION: ICD-10-CM

## 2020-01-15 DIAGNOSIS — Z01.419 WOMEN'S ANNUAL ROUTINE GYNECOLOGICAL EXAMINATION: ICD-10-CM

## 2020-01-15 DIAGNOSIS — Z90.710 STATUS POST LAPAROSCOPIC HYSTERECTOMY: ICD-10-CM

## 2020-01-15 DIAGNOSIS — Z92.89 HISTORY OF MAMMOGRAM: Primary | ICD-10-CM

## 2020-01-15 DIAGNOSIS — Z90.79 STATUS POST BILATERAL SALPINGECTOMY: ICD-10-CM

## 2020-01-15 PROCEDURE — S0612 ANNUAL GYNECOLOGICAL EXAMINA: HCPCS | Performed by: OBSTETRICS & GYNECOLOGY

## 2020-01-15 NOTE — PROGRESS NOTES
Assessment   Annual exam well-woman exam  Status post laparoscopic hysterectomy with salpingectomy  History of pelvic pain  No new gyn complaints or concerns  Insulin-dependent diabetes        Plan   Pap smear no longer indicated post hysterectomy  Screening mammography ordered   All questions answered  Subjective here for annual exam     Jamel Syed is a 40 y o  female who presents for annual exam  Periods are absent patient a total laparoscopic hysterectomy with bilateral salpingectomy several years ago  Currently she is much better  Not any pain symptoms denies any bleeding symptoms  She is sexually active  Denies any her bowels or bladder  The patient reports that there is not domestic violence in her life  Current contraception: status post hysterectomy  History of abnormal Pap smear: no  Family history of uterine or ovarian cancer: no  Regular self breast exam: yes  History of abnormal mammogram: no  Family history of breast cancer: no  History of abnormal lipids: no  Menstrual History:  OB History        6    Para   6    Term   6       0    AB   0    Living   6       SAB   0    TAB   0    Ectopic   0    Multiple   0    Live Births   0                Menarche age: 15  Patient's last menstrual period was 2016  Review of Systems  Pertinent items are noted in HPI        Objective no acute distress   /80 (BP Location: Right arm, Patient Position: Sitting, Cuff Size: Standard)   Ht 5' 3" (1 6 m)   Wt 73 5 kg (162 lb)   LMP 2016 Comment: UHCG NEG  BMI 28 70 kg/m²     General:   alert and oriented, in no acute distress, alert, appears stated age and cooperative   Heart: regular rate and rhythm, S1, S2 normal, no murmur, click, rub or gallop   Lungs: clear to auscultation bilaterally   Abdomen: soft, non-tender, without masses or organomegaly   Vulva: normal, Bartholin's, Urethra, Vicco's normal, female escutcheon   Vagina: normal mucosa, normal discharge Cervix: surgically absent   Uterus: surgically absent   Adnexa: normal adnexa   Bilateral breast exam in the sitting and supine position with chaperone present, no visible or palpable breast lesions identified  No breast masses noted  No supraclavicular or axillary lymphadenopathy noted  No nipple discharge  Reviewed self-breast exam techniques     Rectal exam, deferred

## 2020-01-16 ENCOUNTER — TRANSCRIBE ORDERS (OUTPATIENT)
Dept: ADMINISTRATIVE | Facility: HOSPITAL | Age: 45
End: 2020-01-16

## 2020-01-16 DIAGNOSIS — Z12.31 SCREENING MAMMOGRAM, ENCOUNTER FOR: Primary | ICD-10-CM

## 2020-03-11 ENCOUNTER — HOSPITAL ENCOUNTER (OUTPATIENT)
Dept: MAMMOGRAPHY | Facility: MEDICAL CENTER | Age: 45
Discharge: HOME | End: 2020-03-11
Payer: COMMERCIAL

## 2020-03-11 VITALS — WEIGHT: 162 LBS | BODY MASS INDEX: 28.7 KG/M2 | HEIGHT: 63 IN

## 2020-03-11 DIAGNOSIS — Z12.31 SCREENING MAMMOGRAM, ENCOUNTER FOR: ICD-10-CM

## 2020-03-11 PROCEDURE — 77067 SCR MAMMO BI INCL CAD: CPT

## 2021-01-27 ENCOUNTER — ANNUAL EXAM (OUTPATIENT)
Dept: OBGYN CLINIC | Facility: CLINIC | Age: 46
End: 2021-01-27
Payer: COMMERCIAL

## 2021-01-27 VITALS
HEIGHT: 63 IN | BODY MASS INDEX: 29.77 KG/M2 | DIASTOLIC BLOOD PRESSURE: 80 MMHG | SYSTOLIC BLOOD PRESSURE: 122 MMHG | WEIGHT: 168 LBS

## 2021-01-27 DIAGNOSIS — E78.2 ELEVATED CHOLESTEROL WITH HIGH TRIGLYCERIDES: ICD-10-CM

## 2021-01-27 DIAGNOSIS — R10.2 PELVIC PAIN IN FEMALE: ICD-10-CM

## 2021-01-27 DIAGNOSIS — Z90.79 STATUS POST BILATERAL SALPINGECTOMY: ICD-10-CM

## 2021-01-27 DIAGNOSIS — E13.9 DIABETES 1.5, MANAGED AS TYPE 1 (HCC): Primary | ICD-10-CM

## 2021-01-27 DIAGNOSIS — Z01.419 WOMEN'S ANNUAL ROUTINE GYNECOLOGICAL EXAMINATION: ICD-10-CM

## 2021-01-27 DIAGNOSIS — Z92.89 HISTORY OF MAMMOGRAM: ICD-10-CM

## 2021-01-27 DIAGNOSIS — Z90.710 STATUS POST LAPAROSCOPIC HYSTERECTOMY: ICD-10-CM

## 2021-01-27 PROCEDURE — S0612 ANNUAL GYNECOLOGICAL EXAMINA: HCPCS | Performed by: OBSTETRICS & GYNECOLOGY

## 2021-01-27 NOTE — PROGRESS NOTES
Assessment     Annual well-woman exam    Status post total laparoscopic hysterectomy with bilateral salpingectomy    History of abnormal  Uterine bleeding    Status post endometrial ablation prior to her hysterectomy    Left lower quadrant pain        Plan     Screening laboratory studies    Repeat pelvic ultrasound    Referral to endocrinology for diabetes management    Return 2 weeks for test results    Screening mammography ordered   All questions answered  Await pap smear results  Subjective      Jamel Bolaños Stage is a 39 y o  female   who presents for annual exam  Periods are  Absent status post total laparoscopic hysterectomy with bilateral salpingectomy  Patient now with recurrent pelvic pain symptoms specially on the left  Pain symptoms seem to wax and wane not associated with any particular activity  Is not currently sexually active  The patient reports that there is not domestic violence in her life  Current contraception: none  History of abnormal Pap smear: no  Family history of uterine or ovarian cancer: no  Regular self breast exam: yes  History of abnormal mammogram: no  Family history of breast cancer: no  History of abnormal lipids: no  Menstrual History:  OB History        6    Para   6    Term   6       0    AB   0    Living   6       SAB   0    TAB   0    Ectopic   0    Multiple   0    Live Births   0                Menarche age: 15  Patient's last menstrual period was 2016  Review of Systems  Pertinent items are noted in HPI        Objective  Annual exam   /80 (BP Location: Left arm, Patient Position: Sitting, Cuff Size: Adult)   Ht 5' 3" (1 6 m)   Wt 76 2 kg (168 lb)   LMP 2016 Comment: CG NEG  BMI 29 76 kg/m²     General:   alert and oriented, in no acute distress, alert, appears stated age and cooperative   Heart: regular rate and rhythm, S1, S2 normal, no murmur, click, rub or gallop   Lungs: clear to auscultation bilaterally   Abdomen: soft, non-tender, without masses or organomegaly   Vulva: normal, Bartholin's, Urethra, [place]'s normal, female escutcheon   Vagina: normal mucosa, normal discharge, no palpable nodules   Cervix: surgically absent   Uterus: surgically absent   Adnexa: normal adnexa, pain and fullness left lower quadrant   Bilateral breast exam in the sitting and supine position with chaperone present, no visible or palpable breast lesions identified  No breast masses noted  No supraclavicular or axillary lymphadenopathy noted  No nipple discharge  Reviewed self-breast exam techniques     Rectal exam, deferred

## 2021-02-06 ENCOUNTER — LAB (OUTPATIENT)
Dept: LAB | Facility: HOSPITAL | Age: 46
End: 2021-02-06
Attending: OBSTETRICS & GYNECOLOGY
Payer: COMMERCIAL

## 2021-02-06 ENCOUNTER — HOSPITAL ENCOUNTER (OUTPATIENT)
Dept: ULTRASOUND IMAGING | Facility: HOSPITAL | Age: 46
Discharge: HOME | End: 2021-02-06
Attending: OBSTETRICS & GYNECOLOGY
Payer: COMMERCIAL

## 2021-02-06 DIAGNOSIS — R10.2 PELVIC PAIN IN FEMALE: ICD-10-CM

## 2021-02-06 DIAGNOSIS — E13.9 DIABETES 1.5, MANAGED AS TYPE 1 (HCC): ICD-10-CM

## 2021-02-06 DIAGNOSIS — Z90.710 STATUS POST LAPAROSCOPIC HYSTERECTOMY: ICD-10-CM

## 2021-02-06 DIAGNOSIS — Z01.419 WOMEN'S ANNUAL ROUTINE GYNECOLOGICAL EXAMINATION: ICD-10-CM

## 2021-02-06 DIAGNOSIS — Z90.79 STATUS POST BILATERAL SALPINGECTOMY: ICD-10-CM

## 2021-02-06 DIAGNOSIS — E78.2 ELEVATED CHOLESTEROL WITH HIGH TRIGLYCERIDES: ICD-10-CM

## 2021-02-06 LAB
ALBUMIN SERPL BCP-MCNC: 3.6 G/DL (ref 3.5–5)
ALP SERPL-CCNC: 59 U/L (ref 46–116)
ALT SERPL W P-5'-P-CCNC: 22 U/L (ref 12–78)
ANION GAP SERPL CALCULATED.3IONS-SCNC: 6 MMOL/L (ref 4–13)
AST SERPL W P-5'-P-CCNC: 13 U/L (ref 5–45)
BILIRUB SERPL-MCNC: 0.53 MG/DL (ref 0.2–1)
BUN SERPL-MCNC: 9 MG/DL (ref 5–25)
CALCIUM SERPL-MCNC: 8.6 MG/DL (ref 8.3–10.1)
CHLORIDE SERPL-SCNC: 104 MMOL/L (ref 100–108)
CHOLEST SERPL-MCNC: 190 MG/DL (ref 50–200)
CO2 SERPL-SCNC: 28 MMOL/L (ref 21–32)
CREAT SERPL-MCNC: 0.76 MG/DL (ref 0.6–1.3)
ERYTHROCYTE [DISTWIDTH] IN BLOOD BY AUTOMATED COUNT: 12.6 % (ref 11.6–15.1)
GFR SERPL CREATININE-BSD FRML MDRD: 95 ML/MIN/1.73SQ M
GLUCOSE P FAST SERPL-MCNC: 127 MG/DL (ref 65–99)
HCT VFR BLD AUTO: 42.8 % (ref 34.8–46.1)
HDLC SERPL-MCNC: 55 MG/DL
HGB BLD-MCNC: 13.4 G/DL (ref 11.5–15.4)
LDLC SERPL CALC-MCNC: 112 MG/DL (ref 0–100)
MCH RBC QN AUTO: 28.8 PG (ref 26.8–34.3)
MCHC RBC AUTO-ENTMCNC: 31.3 G/DL (ref 31.4–37.4)
MCV RBC AUTO: 92 FL (ref 82–98)
NONHDLC SERPL-MCNC: 135 MG/DL
PLATELET # BLD AUTO: 241 THOUSANDS/UL (ref 149–390)
PMV BLD AUTO: 11.3 FL (ref 8.9–12.7)
POTASSIUM SERPL-SCNC: 4.3 MMOL/L (ref 3.5–5.3)
PROT SERPL-MCNC: 7.3 G/DL (ref 6.4–8.2)
RBC # BLD AUTO: 4.65 MILLION/UL (ref 3.81–5.12)
SODIUM SERPL-SCNC: 138 MMOL/L (ref 136–145)
TRIGL SERPL-MCNC: 116 MG/DL
TSH SERPL DL<=0.05 MIU/L-ACNC: 0.99 UIU/ML (ref 0.36–3.74)
WBC # BLD AUTO: 9.87 THOUSAND/UL (ref 4.31–10.16)

## 2021-02-06 PROCEDURE — 80061 LIPID PANEL: CPT

## 2021-02-06 PROCEDURE — 85027 COMPLETE CBC AUTOMATED: CPT

## 2021-02-06 PROCEDURE — 80053 COMPREHEN METABOLIC PANEL: CPT

## 2021-02-06 PROCEDURE — 36415 COLL VENOUS BLD VENIPUNCTURE: CPT

## 2021-02-06 PROCEDURE — 76830 TRANSVAGINAL US NON-OB: CPT

## 2021-02-06 PROCEDURE — 76856 US EXAM PELVIC COMPLETE: CPT

## 2021-02-06 PROCEDURE — 84443 ASSAY THYROID STIM HORMONE: CPT

## 2021-02-19 DIAGNOSIS — Z23 ENCOUNTER FOR IMMUNIZATION: ICD-10-CM

## 2021-02-23 ENCOUNTER — OFFICE VISIT (OUTPATIENT)
Dept: OBGYN CLINIC | Facility: CLINIC | Age: 46
End: 2021-02-23
Payer: COMMERCIAL

## 2021-02-23 VITALS
BODY MASS INDEX: 29.48 KG/M2 | WEIGHT: 166.4 LBS | DIASTOLIC BLOOD PRESSURE: 80 MMHG | SYSTOLIC BLOOD PRESSURE: 118 MMHG | HEIGHT: 63 IN

## 2021-02-23 DIAGNOSIS — Z90.710 S/P LAPAROSCOPIC HYSTERECTOMY: ICD-10-CM

## 2021-02-23 DIAGNOSIS — Z90.79 STATUS POST BILATERAL SALPINGECTOMY: ICD-10-CM

## 2021-02-23 DIAGNOSIS — N95.1 MENOPAUSAL SYNDROME (HOT FLASHES): ICD-10-CM

## 2021-02-23 DIAGNOSIS — R10.2 PELVIC PAIN IN FEMALE: ICD-10-CM

## 2021-02-23 PROCEDURE — 99212 OFFICE O/P EST SF 10 MIN: CPT | Performed by: OBSTETRICS & GYNECOLOGY

## 2021-02-23 RX ORDER — GABAPENTIN 300 MG/1
300 CAPSULE ORAL 2 TIMES DAILY
Qty: 60 CAPSULE | Refills: 2 | Status: SHIPPED | OUTPATIENT
Start: 2021-02-23 | End: 2021-03-29

## 2021-02-24 NOTE — PROGRESS NOTES
Assessment/Plan:    Diagnoses and all orders for this visit:    S/P laparoscopic hysterectomy  -     gabapentin (NEURONTIN) 300 mg capsule; Take 1 capsule (300 mg total) by mouth 2 (two) times a day    Status post bilateral salpingectomy  -     gabapentin (NEURONTIN) 300 mg capsule; Take 1 capsule (300 mg total) by mouth 2 (two) times a day    Pelvic pain in female  -     gabapentin (NEURONTIN) 300 mg capsule; Take 1 capsule (300 mg total) by mouth 2 (two) times a day    Menopausal syndrome (hot flashes)  -     gabapentin (NEURONTIN) 300 mg capsule; Take 1 capsule (300 mg total) by mouth 2 (two) times a day        Subjective: here for follow-up and test results     Patient ID: Dee Ritchie is a 55 y o  female  HPI    68-year-old female status post total laparoscopic hysterectomy with bilateral salpingectomy several years ago  She was seen by me in January for annual exam   She began to complain of ongoing pain symptoms especially in her left lower quadrant  She did have pelvic ultrasound done, the right ovary was not visualized the left ovary was normal in size  No suspicious masses were identified  She also had additional laboratory studies done TSH was normal CBC the was for the most part within normal limits, her CMP was normal with elevated fasting blood sugar and 127  ALT/ AST  Normal   Her lipid panel was for the most part normal as well with the exception her triglycerides were slightly elevated and above normal range  Most likely dietary related  She a known diabetic his on insulin and several other medications to treat her diabetes  Review of Systems   Respiratory: Negative  Gastrointestinal: Negative  Genitourinary: Positive for pelvic pain  She is not currently sexually active   Neurological: Negative  Psychiatric/Behavioral: Negative  All other systems reviewed and are negative        Objective:  No acute distress  /80 (BP Location: Right arm, Patient Position: Sitting, Cuff Size: Standard)   Ht 5' 3" (1 6 m)   Wt 75 5 kg (166 lb 6 4 oz)   LMP 12/14/2016 Comment: UHCG NEG  BMI 29 48 kg/m²      Physical Exam  Vitals signs reviewed  Constitutional:       Appearance: Normal appearance  She is obese  Eyes:      Pupils: Pupils are equal, round, and reactive to light  Pulmonary:      Effort: Pulmonary effort is normal    Genitourinary:     General: Normal vulva  Comments:  Recent pelvic exam was within normal limits left lower quadrant pain and tenderness noted  No pelvic masses were noted   normal external genitalia   uterus and tubes were surgically absent  Neurological:      Mental Status: She is alert and oriented to person, place, and time  Psychiatric:         Mood and Affect: Mood normal          Behavior: Behavior normal          Thought Content: Thought content normal          Judgment: Judgment normal            Disposition     After reviewing all her diagnostic studies there was no obvious cause for her pelvic pain symptoms  Most likely related to scar tissue from her previous surgery  Laboratory studies as noted above mild elevation of her triglycerides she will discuss this with her PCP  She would like to try Neurontin 300 mg b i d  to relieve her pain symptoms and improve her sleeping pattern  States she does not sleep well well  Recommend she follow up in approximately 4-6 weeks time to see if there has been any improvement of her pain symptoms  Please note    In addition to the time spent discussing the findings and results of today's visit and exam, I spent approximately 15  minutes of face-to-face time with the patient, greater than 50% of which was spent in counseling and coordination of care for this patient

## 2021-03-06 ENCOUNTER — IMMUNIZATIONS (OUTPATIENT)
Dept: FAMILY MEDICINE CLINIC | Facility: HOSPITAL | Age: 46
End: 2021-03-06

## 2021-03-06 DIAGNOSIS — Z23 ENCOUNTER FOR IMMUNIZATION: Primary | ICD-10-CM

## 2021-03-06 PROCEDURE — 0001A SARS-COV-2 / COVID-19 MRNA VACCINE (PFIZER-BIONTECH) 30 MCG: CPT

## 2021-03-06 PROCEDURE — 91300 SARS-COV-2 / COVID-19 MRNA VACCINE (PFIZER-BIONTECH) 30 MCG: CPT

## 2021-03-27 ENCOUNTER — IMMUNIZATIONS (OUTPATIENT)
Dept: FAMILY MEDICINE CLINIC | Facility: HOSPITAL | Age: 46
End: 2021-03-27

## 2021-03-27 DIAGNOSIS — Z23 ENCOUNTER FOR IMMUNIZATION: Primary | ICD-10-CM

## 2021-03-27 PROCEDURE — 0002A SARS-COV-2 / COVID-19 MRNA VACCINE (PFIZER-BIONTECH) 30 MCG: CPT

## 2021-03-27 PROCEDURE — 91300 SARS-COV-2 / COVID-19 MRNA VACCINE (PFIZER-BIONTECH) 30 MCG: CPT

## 2021-03-29 ENCOUNTER — CONSULT (OUTPATIENT)
Dept: ENDOCRINOLOGY | Facility: CLINIC | Age: 46
End: 2021-03-29
Payer: COMMERCIAL

## 2021-03-29 VITALS
HEART RATE: 88 BPM | DIASTOLIC BLOOD PRESSURE: 90 MMHG | WEIGHT: 167.38 LBS | BODY MASS INDEX: 29.66 KG/M2 | HEIGHT: 63 IN | SYSTOLIC BLOOD PRESSURE: 120 MMHG

## 2021-03-29 DIAGNOSIS — E13.9 DIABETES 1.5, MANAGED AS TYPE 1 (HCC): ICD-10-CM

## 2021-03-29 DIAGNOSIS — Z79.4 CURRENT USE OF INSULIN (HCC): Primary | ICD-10-CM

## 2021-03-29 LAB — SL AMB POCT HEMOGLOBIN AIC: 6.7 (ref ?–6.5)

## 2021-03-29 PROCEDURE — 3008F BODY MASS INDEX DOCD: CPT | Performed by: INTERNAL MEDICINE

## 2021-03-29 PROCEDURE — 1036F TOBACCO NON-USER: CPT | Performed by: INTERNAL MEDICINE

## 2021-03-29 PROCEDURE — 99204 OFFICE O/P NEW MOD 45 MIN: CPT | Performed by: INTERNAL MEDICINE

## 2021-03-29 PROCEDURE — 3044F HG A1C LEVEL LT 7.0%: CPT | Performed by: INTERNAL MEDICINE

## 2021-03-29 PROCEDURE — 83036 HEMOGLOBIN GLYCOSYLATED A1C: CPT | Performed by: INTERNAL MEDICINE

## 2021-03-29 RX ORDER — LANCETS
EACH MISCELLANEOUS 2 TIMES DAILY
Qty: 200 EACH | Refills: 1 | Status: SHIPPED | OUTPATIENT
Start: 2021-03-29

## 2021-03-29 RX ORDER — BLOOD SUGAR DIAGNOSTIC
1 STRIP MISCELLANEOUS 2 TIMES DAILY
Qty: 200 EACH | Refills: 1 | Status: SHIPPED | OUTPATIENT
Start: 2021-03-29 | End: 2021-09-23

## 2021-03-29 RX ORDER — BLOOD-GLUCOSE METER
EACH MISCELLANEOUS 2 TIMES DAILY
Qty: 1 KIT | Refills: 0 | Status: SHIPPED | OUTPATIENT
Start: 2021-03-29

## 2021-03-29 NOTE — PROGRESS NOTES
New Patient Progress Note      Chief Complaint   Patient presents with    Diabetes Mellitus      Referring Provider  Referral Tahmina [address], [address]     History of Present Illness:   Juan F Maciel is a 55 y o  female with a history of diabetes with long term use of insulin seen at the request of Dr Jonathan Leyva in [place]  She recalls being diagnosed with diabetes as gestational DM with her last pregnancy 13yrs ago, which then improved  It returned one year later  She was initially treated with metformin, but she had diarrhea and headaches  This was replaced with lantus  Glipizide is in the chart, but she denies taking this or any other diabetes treatment  There were times when she went a week without insulin  She denies complications, but is concerned about her vision  She denies neuropathy or renal disease, no MI ro CVA  Denies hospitalizations for severe hypoglycemic or severe hyperglycemic episodes  Her med list includes gabapentin, which she states is for hot flashes    Current regimen: Lantus pens 25units once daily at 630am    Injects in her abdomen and rotates sites  further diabetic ROS: blurry vision, she urinates but also drinks water  She denies changes in her weight, or numbness in her feet    She was using a meter inherited from someone   Recalled Home blood glucose readings most were fasting  She recalls 180s  Hypoglycemic episodes: "it's been a while"  Hypoglycemia symptoms: "shaking," anxious  Treatment of hypoglycemia: "tries to eat something" or drink juice      Diabetes education: No  Diet: 2-3 meals per day, with "some" snacks per day  First meal may be BF, but often misses  Bagel or sandwich and coffee; lunch can be ordering out at work as she does not; dinner is the largest meal rice and beans with meat  Snack can be cheese with crackers, or almonds  She is trying to avoid cookies  She has reduced her soda intake     Activity: Daily activity is limited now, but she is a  at the [employer]    Opthamology: not seen since 2017  Podiatry: does not see; self care  Dental: seeing as she needs dental care     Infuenza vaccine:    She has losartan on her medication list, but is not taki it currently     Thyroid disorders: none  History of pancreatitis: no past history; alcohol use is infrequent (once every few weeks),   Cannot recall if she has had high trigs; s/p cholecystectomy    Patient Active Problem List   Diagnosis    S/P laparoscopic hysterectomy    Type II diabetes mellitus ([facility] )    Current use of insulin ([facility] )      Past Medical History:   Diagnosis Date    Adenomyosis     Anxiety     Depression     Diabetes mellitus (HCC)     NIDDM     Headache     Hypertension     Irregular bleeding     Menorrhagia     Pelvic pain       Past Surgical History:   Procedure Laterality Date    CHOLECYSTECTOMY      ELBOW SURGERY      HYSTERECTOMY      IN HYSTEROSCOPY,W/ENDO BX N/A 12/20/2016    Procedure: DILATATION AND CURETTAGE (D&C) WITH HYSTEROSCOPY;  Surgeon: Markus Aguirre MD;  [facility];  Service: Gynecology    IN HYSTEROSCOPY,W/ENDO BX N/A 8/16/2016    Procedure: DILATATION AND CURETTAGE (D&C) WITH HYSTEROSCOPY POLYPECTOMY ;  Surgeon: Markus Aguirre MD;  [facility];  Service: Gynecology    IN HYSTEROSCOPY,W/ENDOMETRIAL ABLATION N/A 12/20/2016    Procedure: ABLATION ENDOMETRIAL Kg Lis;  Surgeon: Markus Aguirre MD;  [facility];  Service: Gynecology    IN LAPAROSCOPY W TOT HYSTERECT UTERUS 250 GRAM OR LESS N/A 6/20/2017    Procedure: HYSTERECTOMY LAPAROSCOPIC TOTAL, BILATERAL SALPINGECTOMY, CYSTO;  Surgeon: Markus Aguirre MD;  [facility];  Service: Gynecology    TUBAL LIGATION      WISDOM TOOTH EXTRACTION        Family History   Problem Relation Age of Onset    No Known Problems Mother     Thyroid disease unspecified Father     No Known Problems Daughter     No Known Problems Maternal Grandmother     No Known Problems Maternal Grandfather     Lung cancer Paternal Grandmother     No Known Problems Paternal Grandfather     No Known Problems Daughter     No Known Problems Daughter     No Known Problems Maternal Aunt     No Known Problems Maternal Aunt     No Known Problems Maternal Aunt     No Known Problems Maternal Aunt     No Known Problems Maternal Aunt     No Known Problems Paternal Aunt      Social History     Tobacco Use    Smoking status: Never Smoker    Smokeless tobacco: Never Used   Substance Use Topics    Alcohol use: Yes     Alcohol/week: 3 0 standard drinks     Types: 3 Glasses of wine per week     Frequency: Monthly or less     Comment: 1x wk      Allergies   Allergen Reactions    Metformin Diarrhea and Headache         Current Outpatient Medications:     gabapentin (NEURONTIN) 300 mg capsule, Take 1 capsule (300 mg total) by mouth 2 (two) times a day, Disp: 60 capsule, Rfl: 2    LANTUS SOLOSTAR 100 units/mL injection pen, Inject 25 Units under the skin daily , Disp: , Rfl:     Blood Glucose Monitoring Suppl (OneTouch Verio) w/Device KIT, Use 2 (two) times a day, Disp: 1 kit, Rfl: 0    glucose blood (OneTouch Verio) test strip, Use 1 each 2 (two) times a day Use as instructed, Disp: 200 each, Rfl: 1    ibuprofen (MOTRIN) 600 mg tablet, Take 1 tablet by mouth every 6 (six) hours as needed for mild pain (Patient not taking: Reported on 1/15/2020), Disp: 30 tablet, Rfl: 0    Lancets (onetouch ultrasoft) lancets, Use 2 (two) times a day, Disp: 200 each, Rfl: 1    losartan (COZAAR) 25 mg tablet, Take 25 mg by mouth every evening, Disp: , Rfl:   Review of Systems   Constitutional: Negative for unexpected weight change  HENT: Negative for hearing loss, trouble swallowing and voice change  Eyes: Negative for visual disturbance  Respiratory: Negative for shortness of breath  Cardiovascular: Negative for palpitations     Gastrointestinal: Negative for constipation, diarrhea and nausea  Endocrine: Negative for polydipsia and polyuria  Neurological: Negative for tremors and weakness  Psychiatric/Behavioral: The patient is not nervous/anxious  All other systems reviewed and are negative  see also HPI    Physical Exam:  Body mass index is 29 65 kg/m²  /90 (BP Location: Left arm, Patient Position: Sitting, Cuff Size: Large)   Pulse 88   Ht 5' 3" (1 6 m)   Wt 75 9 kg (167 lb 6 oz)   LMP 12/14/2016 Comment: UHCG NEG  BMI 29 65 kg/m²    Wt Readings from Last 3 Encounters:   03/29/21 75 9 kg (167 lb 6 oz)   02/23/21 75 5 kg (166 lb 6 4 oz)   01/27/21 76 2 kg (168 lb)       GEN: NAD  E/n/m: mask in place, hearing intact bilat  Eyes: no stare or proptosis, EOMI  Neck: trachea midline, thyroid NT to palpation, nl in size, no nodules or neck masses noted, no cervical LAD  CV; heart reg rate s1s2 nl, no m/r/g appreciated  Resp: CTAB, good effort  Ab+BS  Neuro: no tremor, 2+ DTRs in BUE  MS: no c/c in digits, moves all 4 ext, nl muscle bulk, gait nl  Skin: warm and dry, no palmar erythema  Psych: nl mood and affect, no gross lapses in memory    Patient's shoes and socks removed  Right Foot/Ankle   Right Foot Inspection  Skin Exam: skin normal and skin intact no dry skin, no warmth, no callus, no erythema, no maceration, no abnormal color, no pre-ulcer, no ulcer and no callus                          Toe Exam: ROM and strength within normal limits  Sensory   Vibration: diminished  Proprioception: intact   Monofilament testing: intact  Vascular  Capillary refills: < 3 seconds  The right DP pulse is 2+  The right PT pulse is 2+       Left Foot/Ankle  Left Foot Inspection  Skin Exam: skin normal and skin intactno dry skin, no warmth, no erythema, no maceration, normal color, no pre-ulcer, no ulcer and no callus                         Toe Exam: ROM and strength within normal limits                   Sensory   Vibration: diminished  Proprioception: intact  Monofilament: intact  Vascular  Capillary refills: < 3 seconds  The left DP pulse is 2+  The left PT pulse is 2+  Assign Risk Category:  No deformity present; No loss of protective sensation; No weak pulses       Risk: 0    Labs:     Lab Results   Component Value Date    HGBA1C 7 7 (H) 12/22/2020         Lab Results   Component Value Date    CREATININE 0 76 02/06/2021    BUN 9 02/06/2021    K 4 3 02/06/2021     02/06/2021    CO2 28 02/06/2021     eGFR   Date Value Ref Range Status   02/06/2021 95 ml/min/1 73sq m Final     No components found for: [facility]    Lab Results   Component Value Date    HDL 55 02/06/2021    TRIG 116 02/06/2021       Lab Results   Component Value Date    ALT 22 02/06/2021    AST 13 02/06/2021    ALKPHOS 59 02/06/2021           Impression:  1  Current use of insulin ([facility] )    2  Diabetes 1 5, managed as type 1 ([facility] )           Plan:    Jamel was seen today for diabetes mellitus  Diagnoses and all orders for this visit:    [facility]    Diabetes 1 5, managed as type 1 ([place] [address] )  -     Ambulatory referral to Endocrinology  -     POCT hemoglobin A1c  -     Blood Glucose Monitoring Suppl (OneTouch Verio) w/Device KIT; Use 2 (two) times a day  -     glucose blood (OneTouch Verio) test strip; Use 1 each 2 (two) times a day Use as instructed  -     Lancets (onetouch ultrasoft) lancets; Use 2 (two) times a day  -     Ambulatory referral to Diabetic Education; Future  -     GAD65, IA-2, and Insulin Autoantibody; Future  -     Zinc Transporter 8 (Znt8) Antibody; Future      1  Women's annual routine gynecological examination     - Ambulatory referral to Endocrinology    2  Diabetes 1 5, managed as type 1 ([facility] )    - Ambulatory referral to Endocrinology  - POCT hemoglobin A1c  - Blood Glucose Monitoring Suppl (OneTouch Verio) w/Device KIT; Use 2 (two) times a day  Dispense: 1 kit;  Refill: 0  - glucose blood (OneTouch Verio) test strip; Use 1 each 2 (two) times a day Use as instructed  Dispense: 200 each; Refill: 1  - Lancets (onetouch ultrasoft) lancets; Use 2 (two) times a day  Dispense: 200 each; Refill: 1    1  Diabetes mellitus, unclear type: She has used Lantus monotherapy for several years, but it is unclear if hrt diagnosis is T1 or T2DM  She has not had a hx of DKA or strong family history  I will order antibodies to see if this helps clarify the subtype  Advised checking Bgs 2x daily with readings sent to the office  Recommend DM education and nutrition  Additional agents can be considered once the labs return  Discussed with the patient and all questioned fully answered  She will call me if any problems arise      Counseled patient on diagnostic results, prognosis, risk and benefit of treatment options, instruction for management, importance of treatment compliance, Risk  factor reduction and impressions      Alexus Montano MD

## 2021-04-06 DIAGNOSIS — E13.9 DIABETES 1.5, MANAGED AS TYPE 1 (HCC): Primary | ICD-10-CM

## 2021-04-06 RX ORDER — BLOOD-GLUCOSE CONTROL, NORMAL
EACH MISCELLANEOUS DAILY PRN
Qty: 1 EACH | Refills: 1 | Status: SHIPPED | OUTPATIENT
Start: 2021-04-06 | End: 2021-04-09 | Stop reason: SDUPTHER

## 2021-04-06 NOTE — TELEPHONE ENCOUNTER
Jamel Daly  to Lenora Sanchez MD          5:00 PM  Hi I'm just trying to reach out because I did get my glucose meter  It requires a control solution to test the strips but was not included in the kit and I have to purchase separately  I've been trying to look for it at pharmacies and I have not had any luck  Is that something that I should be worried about or is it okay for me to test my sugars without it until I find one   Thank you

## 2021-04-07 ENCOUNTER — OFFICE VISIT (OUTPATIENT)
Dept: DIABETES SERVICES | Facility: CLINIC | Age: 46
End: 2021-04-07
Payer: COMMERCIAL

## 2021-04-07 ENCOUNTER — APPOINTMENT (OUTPATIENT)
Dept: LAB | Facility: CLINIC | Age: 46
End: 2021-04-07
Payer: COMMERCIAL

## 2021-04-07 VITALS — WEIGHT: 167 LBS | BODY MASS INDEX: 29.58 KG/M2

## 2021-04-07 DIAGNOSIS — E13.9 DIABETES 1.5, MANAGED AS TYPE 1 (HCC): ICD-10-CM

## 2021-04-07 DIAGNOSIS — E13.9 DIABETES 1.5, MANAGED AS TYPE 1 (HCC): Primary | ICD-10-CM

## 2021-04-07 PROCEDURE — 83519 RIA NONANTIBODY: CPT

## 2021-04-07 PROCEDURE — 36415 COLL VENOUS BLD VENIPUNCTURE: CPT

## 2021-04-07 PROCEDURE — 97802 MEDICAL NUTRITION INDIV IN: CPT | Performed by: DIETITIAN, REGISTERED

## 2021-04-07 PROCEDURE — 86341 ISLET CELL ANTIBODY: CPT

## 2021-04-07 NOTE — PATIENT INSTRUCTIONS
1  Consume 3 meals a day 4-5 hours (no meal skipping)  2  45 grams of carbs per meal   3  Initiate regular aerobic exercise

## 2021-04-07 NOTE — PROGRESS NOTES
Medical Nutrition Therapy        Assessment    Visit Type: Initial visit  Chief complaint/Medical Diagnosis/reason for visit E13 9 (DM 1 5, managed as T1DM)    WILLY Mccullough was seen today for initial MNT visit  Jamel has a history of gestational diabetes and was diagnosed with diabetes about one year after having her child  She is a  for a [employer] and reports a changing schedule which makes it difficult to eat lunch at the same time everyday  Problems identified in food recall include inconsistent carbohydrate intake at meals and meal skipping  Provided patient with guidelines for a 1300 calorie meal plan to assist with consistency, balance and portion control  Encouraged the consumption of regular meals at regular times 4-5 hours apart (no meal skipping)  Advised patient to keep carbohydrate intake to 45 grams per meal to assist with glycemic control  Suggested keeping protein intake to 6 ounces a day and fat to 4 servings daily to assist with lipid management and calorie control  Portion booklet and food labels were used to teach basic carbohydrate counting  Patient would benefit from initiating regular aerobic exercise and keeping daily food logs  RD will remain available for further dietary questions/concerns  Ht Readings from Last 1 Encounters:   03/29/21 5' 3" (1 6 m)     Wt Readings from Last 3 Encounters:   04/07/21 75 8 kg (167 lb)   03/29/21 75 9 kg (167 lb 6 oz)   02/23/21 75 5 kg (166 lb 6 4 oz)     Weight Change: Yes Weighed 190 two years ago and got down to 157  Has regained 10 pouinds in the past 6 months       Barriers to Learning: no barriers    Do you follow any special diet presently?: No  Who shops: patient  Who cooks: patient    Food Log: Completed via the method of food recall    Breakfast:SKIPS MOST DAYS; 6:30-7:00AM coffee with cream or flavored creamer, sweet-n-low, 1-2 days a week: frozen croissant breakfast sandwich  Morning Snack:none  Lunch:SKIPS 2 days a week: protein shake or tuna packet sometimes with or without crackers orders out: [place] food; water  Afternoon Snack: none  Dinner:5:00-7:00PM 3 days a week: 1 cup Special K with berries cereal with 1/2 cup 1% or 2% milk or 4 days a week: 1 cup ice, 1/2 cup beans, chicken or pork, water  Evening Snack: 2-3 times a week: 1/3 cup almonds  Beverages: water and coffee  Eating out/Take out:2 times a month  Exercise nothing beyond daily activities     Calorie needs 1300 kcals/day Carbs: 45g/meal    Fat: 4 servings/day    Protein:6 ounces/day    Nutrition Diagnosis:  Food and nutrition related knowledge deficit  related to Lack of prior exposure to accurate nutrition related information as evidenced by No prior knowledge of need for food and nutrition related recommendations    Intervention: label reading, behavior modification strategies, carbohydrate counting, increased plant based foods, meal timing, meal planning, monitoring portion control, exercise guidelines and food diary     Treatment Goals: Patient will consume 3 meals a day, Patient will count carbohydrates and Patient will exercise    Monitoring and evaluation:    Term code indicator  FH 1 3 2 Food Intake Criteria: Consume 3 meals a day 4-5 hours (no meal skipping)  Term code indicator  FH 1 6 3 Carbohydrate Intake Criteria: 45 grams of carbs per meal   Term code indicator  CH 2 2 Treatments/Therapy/Alternative Medicine Criteria:  Initiate regular aerobic exercise  Materials Provided: Portion booklet and food logs    Patients Response to Instruction:  Comprehensiongood  Motivationgood  Expected Compliancegood    Start- Stop: 1:00-1:35  Total Minutes: 35 Minutes  Group or Individual Instruction: MNT-I  Other: Rodolfo Salas MD    Thank you for coming to the [facility] for education today  Please feel free to call with any questions or concerns      Keith Cordova  [address] [place] [id number]

## 2021-04-09 DIAGNOSIS — E13.9 DIABETES 1.5, MANAGED AS TYPE 1 (HCC): ICD-10-CM

## 2021-04-09 LAB — GAD65 AB SER-ACNC: <5 U/ML (ref 0–5)

## 2021-04-09 RX ORDER — BLOOD-GLUCOSE CONTROL, NORMAL
EACH MISCELLANEOUS
Qty: 1 EACH | Refills: 1 | Status: SHIPPED | OUTPATIENT
Start: 2021-04-09

## 2021-04-22 ENCOUNTER — HOSPITAL ENCOUNTER (OUTPATIENT)
Dept: MAMMOGRAPHY | Facility: MEDICAL CENTER | Age: 46
Discharge: HOME | End: 2021-04-22
Payer: COMMERCIAL

## 2021-04-22 VITALS — WEIGHT: 167 LBS | HEIGHT: 63 IN | BODY MASS INDEX: 29.59 KG/M2

## 2021-04-22 DIAGNOSIS — Z12.31 VISIT FOR SCREENING MAMMOGRAM: ICD-10-CM

## 2021-04-22 PROCEDURE — 77063 BREAST TOMOSYNTHESIS BI: CPT

## 2021-04-22 PROCEDURE — 77067 SCR MAMMO BI INCL CAD: CPT

## 2021-05-12 ENCOUNTER — TELEPHONE (OUTPATIENT)
Dept: ENDOCRINOLOGY | Facility: CLINIC | Age: 46
End: 2021-05-12

## 2021-05-13 NOTE — TELEPHONE ENCOUNTER
Please let her know that I do have her sugars and I'm waiting for one last blood test result before making a change    Thanks

## 2021-05-26 ENCOUNTER — TELEPHONE (OUTPATIENT)
Dept: ENDOCRINOLOGY | Facility: CLINIC | Age: 46
End: 2021-05-26

## 2021-05-26 LAB — MISCELLANEOUS LAB TEST RESULT: NORMAL

## 2021-05-26 NOTE — TELEPHONE ENCOUNTER
Called  lab and spoke to Enoc  She will reach out to [facility]  If any issues she will call back  If it's finalized she will obtain a copy of the report and fax results to our office

## 2021-05-26 NOTE — TELEPHONE ENCOUNTER
----- Message from Romulo Watson MD sent at 5/26/2021  9:59 AM EDT -----  Regarding: Zinc transporter 8 ab  I ordered the Zinc trasnporter 8 antibody and it looks like it is still "in process" from April  In the past it's come back sooner then this  Can you please follow-up on the result from the lab?  Thanks

## 2021-05-27 ENCOUNTER — TELEPHONE (OUTPATIENT)
Dept: ENDOCRINOLOGY | Facility: CLINIC | Age: 46
End: 2021-05-27

## 2021-06-21 ENCOUNTER — OFFICE VISIT (OUTPATIENT)
Dept: ENDOCRINOLOGY | Facility: CLINIC | Age: 46
End: 2021-06-21
Payer: COMMERCIAL

## 2021-06-21 VITALS
HEIGHT: 63 IN | HEART RATE: 94 BPM | BODY MASS INDEX: 29.59 KG/M2 | DIASTOLIC BLOOD PRESSURE: 78 MMHG | SYSTOLIC BLOOD PRESSURE: 140 MMHG | WEIGHT: 167 LBS

## 2021-06-21 DIAGNOSIS — R03.0 ELEVATED BP WITHOUT DIAGNOSIS OF HYPERTENSION: ICD-10-CM

## 2021-06-21 DIAGNOSIS — Z79.4 CURRENT USE OF INSULIN (HCC): Primary | ICD-10-CM

## 2021-06-21 DIAGNOSIS — E11.9 TYPE 2 DIABETES MELLITUS WITHOUT COMPLICATION, WITH LONG-TERM CURRENT USE OF INSULIN (HCC): ICD-10-CM

## 2021-06-21 DIAGNOSIS — Z79.4 TYPE 2 DIABETES MELLITUS WITHOUT COMPLICATION, WITH LONG-TERM CURRENT USE OF INSULIN (HCC): ICD-10-CM

## 2021-06-21 PROCEDURE — 1036F TOBACCO NON-USER: CPT | Performed by: INTERNAL MEDICINE

## 2021-06-21 PROCEDURE — 99214 OFFICE O/P EST MOD 30 MIN: CPT | Performed by: INTERNAL MEDICINE

## 2021-06-21 PROCEDURE — 3008F BODY MASS INDEX DOCD: CPT | Performed by: INTERNAL MEDICINE

## 2021-06-21 NOTE — PROGRESS NOTES
Est Patient Progress Note      Chief Complaint   Patient presents with    Diabetes Type 2      Referring Provider  Referral Tahmina [address], [address]     History of Present Illness:   Felix Woods is a 55 y o  female with a history of T2 diabetes with long term use of insulin seen in follow-up  Last in the office in March 2021  She was seen by RD in 4/2021  She recalls being diagnosed with diabetes as gestational DM with her last pregnancy 13yrs ago, which then improved  It returned one year later  She was initially treated with metformin, but she had diarrhea and headaches  This was replaced with lantus  Glipizide is in the chart, but she denies taking this or any other diabetes treatment  There were times when she went a week without insulin  PATTIE abs were checked and were undetectable    She denies complications, but is concerned about her vision  She denies neuropathy or renal disease, no MI ro CVA  Denies hospitalizations for severe hypoglycemic or severe hyperglycemic episodes  Her med list includes gabapentin, which she states is for hot flashes  Current regimen: Lantus pens 25units once daily at 630am    Injects in her abdomen and rotates sites  Home blood glucose readings most were fasting  Pre-bf: , with some 188 and 201  Bed: 105-160  There are 208, 224  Lowest Bg was 90       Hypoglycemic episodes: none  Hypoglycemia symptoms: "shaking," anxious  Treatment of hypoglycemia: "tries to eat something" or drink juice      Diabetes education: saw RD  Diet: She is trying to get more protein in her diet  Activity: still limited    Opthamology: not seen since 2017  Podiatry: does not see; self care  Dental: continues to see     She is not taking losartan so it is removed from her list      Thyroid disorders: none  History of pancreatitis: no past history; alcohol use is infrequent (once every few weeks),   Cannot recall if she has had high trigs; s/p cholecystectomy    Patient Active Problem List   Diagnosis    S/P laparoscopic hysterectomy    Type II diabetes mellitus ([facility] )    Current use of insulin (HCC)    Elevated BP without diagnosis of hypertension      Past Medical History:   Diagnosis Date    Adenomyosis     Anxiety     Depression     Diabetes mellitus (HCC)     NIDDM     Headache     Hypertension     Irregular bleeding     Menorrhagia     Pelvic pain       Past Surgical History:   Procedure Laterality Date    CHOLECYSTECTOMY      ELBOW SURGERY      HYSTERECTOMY      CA HYSTEROSCOPY,W/ENDO BX N/A 12/20/2016    Procedure: DILATATION AND CURETTAGE (D&C) WITH HYSTEROSCOPY;  Surgeon: Carlos Palomares MD;  [facility];  Service: Gynecology    CA HYSTEROSCOPY,W/ENDO BX N/A 8/16/2016    Procedure: DILATATION AND CURETTAGE (D&C) WITH HYSTEROSCOPY POLYPECTOMY ;  Surgeon: Carlos Palomares MD;  [facility];  Service: Gynecology    CA HYSTEROSCOPY,W/ENDOMETRIAL ABLATION N/A 12/20/2016    Procedure: ABLATION ENDOMETRIAL [address];  Surgeon: Carlos Palomares MD;  [facility];  Service: Gynecology    CA LAPAROSCOPY W TOT HYSTERECT UTERUS 250 GRAM OR LESS N/A 6/20/2017    Procedure: HYSTERECTOMY LAPAROSCOPIC TOTAL, BILATERAL SALPINGECTOMY, CYSTO;  Surgeon: Carlos Palomares MD;  [facility];  Service: Gynecology    TUBAL LIGATION      WISDOM TOOTH EXTRACTION        Family History   Problem Relation Age of Onset    No Known Problems Mother     Thyroid disease unspecified Father     No Known Problems Daughter     No Known Problems Maternal Grandmother     No Known Problems Maternal Grandfather     Lung cancer Paternal Grandmother     No Known Problems Paternal Grandfather     No Known Problems Daughter     No Known Problems Daughter     No Known Problems Maternal Aunt     No Known Problems Maternal Aunt     No Known Problems Maternal Aunt     No Known Problems Maternal Aunt     No Known Problems Maternal Aunt     No Known Problems Paternal Aunt      Social History     Tobacco Use    Smoking status: Never Smoker    Smokeless tobacco: Never Used   Substance Use Topics    Alcohol use: Yes     Alcohol/week: 3 0 standard drinks     Types: 3 Glasses of wine per week     Comment: 1x wk      Allergies   Allergen Reactions    Metformin Diarrhea and Headache         Current Outpatient Medications:     Blood Glucose Calibration (OneTouch Verio) SOLN, Use as needed to calibrate test strips, Disp: 1 each, Rfl: 1    Blood Glucose Monitoring Suppl (OneTouch Verio) w/Device KIT, Use 2 (two) times a day, Disp: 1 kit, Rfl: 0    glucose blood (OneTouch Verio) test strip, Use 1 each 2 (two) times a day Use as instructed, Disp: 200 each, Rfl: 1    ibuprofen (MOTRIN) 600 mg tablet, Take 1 tablet by mouth every 6 (six) hours as needed for mild pain, Disp: 30 tablet, Rfl: 0    Lancets (onetouch ultrasoft) lancets, Use 2 (two) times a day, Disp: 200 each, Rfl: 1    LANTUS SOLOSTAR 100 units/mL injection pen, Inject 25 Units under the skin daily , Disp: , Rfl:     Dulaglutide 0 75 MG/0 5ML SOPN, Inject 0 5 mL (0 75 mg total) under the skin once a week, Disp: 2 mL, Rfl: 2    gabapentin (NEURONTIN) 300 mg capsule, Take 1 capsule (300 mg total) by mouth 2 (two) times a day, Disp: 60 capsule, Rfl: 2  Review of Systems   Constitutional: Negative for unexpected weight change  HENT: Negative for hearing loss, trouble swallowing and voice change  Eyes: Negative for visual disturbance  Gastrointestinal: Negative for constipation, diarrhea and nausea  Endocrine: Negative for polydipsia and polyuria  Neurological: Negative for tremors and weakness  Psychiatric/Behavioral: The patient is not nervous/anxious  All other systems reviewed and are negative  see also HPI    Physical Exam:  Body mass index is 29 58 kg/m²    /78 (BP Location: Left arm, Patient Position: Sitting)   Pulse 94   Ht 5' 3" (1 6 m)   Wt 75 8 kg (167 lb) LMP 12/14/2016 Comment: UHCG NEG  BMI 29 58 kg/m²    Wt Readings from Last 3 Encounters:   06/21/21 75 8 kg (167 lb)   04/22/21 75 8 kg (167 lb)   04/07/21 75 8 kg (167 lb)       GEN: NAD    Physical Exam   Gen: appears well-developed and well-nourished  No apparent distress  Head: Normocephalic and atraumatic  Eyes: no stare or proptosis, no periorbital edema  E/N/M: mask in place, hearing grossly intact  Neck: range of motion nl  Pulmonary/Chest: breathing  comfortably, no accessory muscle use, effort normal    Musculoskeletal: moves all extremities, gait nl  Neurological: alert and oriented to person, place, and time  No upper ext tremor appreciated  Skin: does not appear diaphoretic  Psychiatric: normal mood and affect; behavior is normal; no gross lapses in memory, answer questions appropriately        Labs:     Lab Results   Component Value Date    HGBA1C 6 7 (A) 03/29/2021         Lab Results   Component Value Date    CREATININE 0 76 02/06/2021    BUN 9 02/06/2021    K 4 3 02/06/2021     02/06/2021    CO2 28 02/06/2021     eGFR   Date Value Ref Range Status   02/06/2021 95 ml/min/1 73sq m Final     No components found for: [facility]    Lab Results   Component Value Date    HDL 55 02/06/2021    TRIG 116 02/06/2021       Lab Results   Component Value Date    ALT 22 02/06/2021    AST 13 02/06/2021    ALKPHOS 59 02/06/2021           Impression:  1  Current use of insulin ([address] )    2  Type 2 diabetes mellitus without complication, with long-term current use of insulin ([facility])    3  Elevated BP without diagnosis of hypertension           Plan:    Jamel was seen today for diabetes type 2  Diagnoses and all orders for this visit:    Current use of insulin ([address] )  -     Cancel: Ambulatory referral to Optometry; Future  -     Hemoglobin A1C; Future  -     Microalbumin / creatinine urine ratio; Future  -     Basic metabolic panel Lab Collect;  Future  -     Dulaglutide 0 75 MG/0 5ML SOPN; Inject 0 5 mL (0 75 mg total) under the skin once a week  -     Ambulatory referral to Optometry; Future    Type 2 diabetes mellitus without complication, with long-term current use of insulin (HCC)  -     Hemoglobin A1C; Future  -     Microalbumin / creatinine urine ratio; Future  -     Basic metabolic panel Lab Collect; Future  -     Dulaglutide 0 75 MG/0 5ML SOPN; Inject 0 5 mL (0 75 mg total) under the skin once a week  -     Ambulatory referral to Optometry; Future    Elevated BP without diagnosis of hypertension        1  Diabetes mellitus, Type 2: She has used Lantus monotherapy for several years  She saw our RD with some improvement in her diet  She is due for an 1c and this is provided  We discussed adding a GLP and she is interested  We may be able to lower her Lantus if she tolerates this  She does not have increased risk for pancreatitis or personal of family hx of MTC  Advised eye exam and       2  Elevated BP: BP is elevated in the office she is not taking anything for this and has not seen her PCP  I kurtis order urine MACr and advised her to have a physical with her PCP  Discussed with the patient and all questioned fully answered  She will call me if any problems arise      Counseled patient on diagnostic results, prognosis, risk and benefit of treatment options, instruction for management, importance of treatment compliance, Risk  factor reduction and impressions      Pema Flores MD

## 2021-06-21 NOTE — PATIENT INSTRUCTIONS
Please get your labs  You can start the Trulicity 3 23ZA weekly  Keep checking the sugars and please send them in about one month    Call if you are getting low blood sugars

## 2021-07-09 LAB
LEFT EYE DIABETIC RETINOPATHY: NORMAL
RIGHT EYE DIABETIC RETINOPATHY: NORMAL

## 2021-07-16 ENCOUNTER — LAB (OUTPATIENT)
Dept: LAB | Facility: HOSPITAL | Age: 46
End: 2021-07-16
Attending: INTERNAL MEDICINE
Payer: COMMERCIAL

## 2021-07-16 DIAGNOSIS — Z79.4 CURRENT USE OF INSULIN (HCC): ICD-10-CM

## 2021-07-16 DIAGNOSIS — Z79.4 TYPE 2 DIABETES MELLITUS WITHOUT COMPLICATION, WITH LONG-TERM CURRENT USE OF INSULIN (HCC): ICD-10-CM

## 2021-07-16 DIAGNOSIS — E11.9 TYPE 2 DIABETES MELLITUS WITHOUT COMPLICATION, WITH LONG-TERM CURRENT USE OF INSULIN (HCC): ICD-10-CM

## 2021-07-16 LAB
ANION GAP SERPL CALCULATED.3IONS-SCNC: 7 MMOL/L (ref 4–13)
BUN SERPL-MCNC: 13 MG/DL (ref 5–25)
CALCIUM SERPL-MCNC: 8.1 MG/DL (ref 8.3–10.1)
CHLORIDE SERPL-SCNC: 107 MMOL/L (ref 100–108)
CO2 SERPL-SCNC: 27 MMOL/L (ref 21–32)
CREAT SERPL-MCNC: 0.85 MG/DL (ref 0.6–1.3)
CREAT UR-MCNC: 127 MG/DL
EST. AVERAGE GLUCOSE BLD GHB EST-MCNC: 134 MG/DL
GFR SERPL CREATININE-BSD FRML MDRD: 82 ML/MIN/1.73SQ M
GLUCOSE P FAST SERPL-MCNC: 103 MG/DL (ref 65–99)
HBA1C MFR BLD: 6.3 %
MICROALBUMIN UR-MCNC: 6.1 MG/L (ref 0–20)
MICROALBUMIN/CREAT 24H UR: 5 MG/G CREATININE (ref 0–30)
POTASSIUM SERPL-SCNC: 3.9 MMOL/L (ref 3.5–5.3)
SODIUM SERPL-SCNC: 141 MMOL/L (ref 136–145)

## 2021-07-16 PROCEDURE — 80048 BASIC METABOLIC PNL TOTAL CA: CPT

## 2021-07-16 PROCEDURE — 36415 COLL VENOUS BLD VENIPUNCTURE: CPT

## 2021-07-16 PROCEDURE — 83036 HEMOGLOBIN GLYCOSYLATED A1C: CPT

## 2021-07-16 PROCEDURE — 3061F NEG MICROALBUMINURIA REV: CPT | Performed by: INTERNAL MEDICINE

## 2021-07-16 PROCEDURE — 82570 ASSAY OF URINE CREATININE: CPT

## 2021-07-16 PROCEDURE — 82043 UR ALBUMIN QUANTITATIVE: CPT

## 2021-07-16 PROCEDURE — 3044F HG A1C LEVEL LT 7.0%: CPT | Performed by: INTERNAL MEDICINE

## 2021-07-19 ENCOUNTER — DOCUMENTATION (OUTPATIENT)
Dept: ENDOCRINOLOGY | Facility: CLINIC | Age: 46
End: 2021-07-19

## 2021-08-17 ENCOUNTER — TELEPHONE (OUTPATIENT)
Dept: ENDOCRINOLOGY | Facility: CLINIC | Age: 46
End: 2021-08-17

## 2021-08-18 DIAGNOSIS — E11.9 TYPE 2 DIABETES MELLITUS WITHOUT COMPLICATION, WITH LONG-TERM CURRENT USE OF INSULIN (HCC): Primary | ICD-10-CM

## 2021-08-18 DIAGNOSIS — Z79.4 TYPE 2 DIABETES MELLITUS WITHOUT COMPLICATION, WITH LONG-TERM CURRENT USE OF INSULIN (HCC): Primary | ICD-10-CM

## 2021-08-18 RX ORDER — DULAGLUTIDE 1.5 MG/.5ML
1.5 INJECTION, SOLUTION SUBCUTANEOUS WEEKLY
Qty: 2 ML | Refills: 5 | Status: SHIPPED | OUTPATIENT
Start: 2021-08-18 | End: 2021-11-05 | Stop reason: DRUGHIGH

## 2021-08-18 RX ORDER — INSULIN GLARGINE 100 [IU]/ML
15 INJECTION, SOLUTION SUBCUTANEOUS DAILY
Qty: 15 ML | Refills: 1
Start: 2021-08-18 | End: 2021-09-08

## 2021-08-18 NOTE — TELEPHONE ENCOUNTER
Please let her know that I reviewed her logs  If she feels well on the Trulicity, then we can increase to the 1 5mg weekly dose and lower the insulin further to 15units once daily   Thanks

## 2021-09-08 ENCOUNTER — TELEPHONE (OUTPATIENT)
Dept: ENDOCRINOLOGY | Facility: CLINIC | Age: 46
End: 2021-09-08

## 2021-09-08 DIAGNOSIS — Z79.4 TYPE 2 DIABETES MELLITUS WITHOUT COMPLICATION, WITH LONG-TERM CURRENT USE OF INSULIN (HCC): ICD-10-CM

## 2021-09-08 DIAGNOSIS — E11.9 TYPE 2 DIABETES MELLITUS WITHOUT COMPLICATION, WITH LONG-TERM CURRENT USE OF INSULIN (HCC): ICD-10-CM

## 2021-09-08 RX ORDER — INSULIN GLARGINE 100 [IU]/ML
10 INJECTION, SOLUTION SUBCUTANEOUS DAILY
Qty: 15 ML | Refills: 1
Start: 2021-09-08 | End: 2021-11-05 | Stop reason: ALTCHOICE

## 2021-09-08 NOTE — TELEPHONE ENCOUNTER
Let her know that the sugars look very good  She can lower her Lantus to 10units and keep the Trulicity 6 6CJ weekly for now   Thanks

## 2021-10-04 ENCOUNTER — TELEMEDICINE (OUTPATIENT)
Dept: ENDOCRINOLOGY | Facility: CLINIC | Age: 46
End: 2021-10-04
Payer: COMMERCIAL

## 2021-10-04 DIAGNOSIS — E11.9 TYPE 2 DIABETES MELLITUS WITHOUT COMPLICATION, WITH LONG-TERM CURRENT USE OF INSULIN (HCC): Primary | ICD-10-CM

## 2021-10-04 DIAGNOSIS — Z79.4 TYPE 2 DIABETES MELLITUS WITHOUT COMPLICATION, WITH LONG-TERM CURRENT USE OF INSULIN (HCC): Primary | ICD-10-CM

## 2021-10-04 DIAGNOSIS — R03.0 ELEVATED BP WITHOUT DIAGNOSIS OF HYPERTENSION: ICD-10-CM

## 2021-10-04 PROCEDURE — 99441 PR PHYS/QHP TELEPHONE EVALUATION 5-10 MIN: CPT | Performed by: PHYSICIAN ASSISTANT

## 2021-10-05 ENCOUNTER — TELEPHONE (OUTPATIENT)
Dept: ENDOCRINOLOGY | Facility: CLINIC | Age: 46
End: 2021-10-05

## 2021-10-08 ENCOUNTER — TELEPHONE (OUTPATIENT)
Dept: ADMINISTRATIVE | Facility: OTHER | Age: 46
End: 2021-10-08

## 2021-10-26 ENCOUNTER — TELEPHONE (OUTPATIENT)
Dept: ADMINISTRATIVE | Facility: OTHER | Age: 46
End: 2021-10-26

## 2021-11-01 ENCOUNTER — TELEPHONE (OUTPATIENT)
Dept: ENDOCRINOLOGY | Facility: CLINIC | Age: 46
End: 2021-11-01

## 2021-11-04 ENCOUNTER — TELEPHONE (OUTPATIENT)
Dept: ENDOCRINOLOGY | Facility: CLINIC | Age: 46
End: 2021-11-04

## 2021-11-05 DIAGNOSIS — Z79.4 TYPE 2 DIABETES MELLITUS WITHOUT COMPLICATION, WITH LONG-TERM CURRENT USE OF INSULIN (HCC): Primary | ICD-10-CM

## 2021-11-05 DIAGNOSIS — E11.9 TYPE 2 DIABETES MELLITUS WITHOUT COMPLICATION, WITH LONG-TERM CURRENT USE OF INSULIN (HCC): Primary | ICD-10-CM

## 2021-11-05 RX ORDER — DULAGLUTIDE 3 MG/.5ML
3 INJECTION, SOLUTION SUBCUTANEOUS WEEKLY
Qty: 6 ML | Refills: 1 | Status: SHIPPED | OUTPATIENT
Start: 2021-11-05 | End: 2022-04-25 | Stop reason: SDUPTHER

## 2021-11-05 RX ORDER — DULAGLUTIDE 3 MG/.5ML
3 INJECTION, SOLUTION SUBCUTANEOUS WEEKLY
Qty: 2 ML | Refills: 5 | Status: SHIPPED | OUTPATIENT
Start: 2021-11-05 | End: 2022-06-01 | Stop reason: SDUPTHER

## 2021-12-06 ENCOUNTER — TELEPHONE (OUTPATIENT)
Dept: ENDOCRINOLOGY | Facility: CLINIC | Age: 46
End: 2021-12-06

## 2022-01-18 ENCOUNTER — TELEPHONE (OUTPATIENT)
Dept: ENDOCRINOLOGY | Facility: CLINIC | Age: 47
End: 2022-01-18

## 2022-03-04 ENCOUNTER — TELEPHONE (OUTPATIENT)
Dept: ENDOCRINOLOGY | Facility: CLINIC | Age: 47
End: 2022-03-04

## 2022-04-22 ENCOUNTER — OFFICE VISIT (OUTPATIENT)
Dept: OBGYN CLINIC | Facility: CLINIC | Age: 47
End: 2022-04-22
Payer: COMMERCIAL

## 2022-04-22 VITALS
SYSTOLIC BLOOD PRESSURE: 120 MMHG | BODY MASS INDEX: 26.4 KG/M2 | WEIGHT: 149 LBS | HEIGHT: 63 IN | DIASTOLIC BLOOD PRESSURE: 86 MMHG

## 2022-04-22 DIAGNOSIS — Z90.710 S/P LAPAROSCOPIC HYSTERECTOMY: ICD-10-CM

## 2022-04-22 DIAGNOSIS — Z90.79 STATUS POST BILATERAL SALPINGECTOMY: ICD-10-CM

## 2022-04-22 DIAGNOSIS — Z12.11 COLON CANCER SCREENING: ICD-10-CM

## 2022-04-22 DIAGNOSIS — Z12.31 ENCOUNTER FOR SCREENING MAMMOGRAM FOR BREAST CANCER: ICD-10-CM

## 2022-04-22 DIAGNOSIS — Z98.890 STATUS POST ENDOMETRIAL ABLATION: ICD-10-CM

## 2022-04-22 DIAGNOSIS — Z12.11 SCREENING FOR COLON CANCER: ICD-10-CM

## 2022-04-22 DIAGNOSIS — Z98.890 STATUS POST D&C: ICD-10-CM

## 2022-04-22 DIAGNOSIS — E11.00 TYPE 2 DIABETES MELLITUS WITH HYPEROSMOLARITY WITHOUT COMA, WITHOUT LONG-TERM CURRENT USE OF INSULIN (HCC): ICD-10-CM

## 2022-04-22 PROCEDURE — S0612 ANNUAL GYNECOLOGICAL EXAMINA: HCPCS | Performed by: OBSTETRICS & GYNECOLOGY

## 2022-04-22 RX ORDER — AMOXICILLIN 875 MG/1
TABLET, COATED ORAL
COMMUNITY
Start: 2022-04-18

## 2022-04-22 RX ORDER — IBUPROFEN 800 MG/1
800 TABLET ORAL EVERY 6 HOURS PRN
COMMUNITY
Start: 2022-04-18

## 2022-04-22 RX ORDER — OXYCODONE HYDROCHLORIDE AND ACETAMINOPHEN 5; 325 MG/1; MG/1
1 TABLET ORAL EVERY 6 HOURS PRN
COMMUNITY
Start: 2022-04-18

## 2022-04-22 RX ORDER — POLYETHYLENE GLYCOL-3350 AND ELECTROLYTES 236; 6.74; 5.86; 2.97; 22.74 G/274.31G; G/274.31G; G/274.31G; G/274.31G; G/274.31G
POWDER, FOR SOLUTION ORAL
COMMUNITY
Start: 2022-03-25

## 2022-04-22 RX ORDER — POLYETHYLENE GLYCOL 3350 17 G/17G
17 POWDER, FOR SOLUTION ORAL DAILY
COMMUNITY
Start: 2021-08-13 | End: 2022-08-13

## 2022-04-22 RX ORDER — LINACLOTIDE 290 UG/1
1 CAPSULE, GELATIN COATED ORAL DAILY
COMMUNITY
Start: 2022-02-08

## 2022-04-22 NOTE — PROGRESS NOTES
Assessment     Annual well-woman exam    Status post laparoscopic hysterectomy with bilateral salpingectomy,     History of colonoscopy    Status post D&C    Status post endometrial ablation    Diabetes type 2        Plan     Colon cancer screening has follow-up colonoscopy planned    Prescription for screening mammography given to patient   All questions answered  Pap smear no longer indicated secondary to hysterectomy    Subjective      Jamel Frankel is a 52 y o  female who presents for annual exam  Periods are absent since her laparoscopic hysterectomy 5 years ago  Denies any pelvic pain symptoms denies any unusual vaginal discharge or vaginal dryness  She is sexually active and has no concerns or pain with sex  The patient reports that there is not domestic violence in her life  Current contraception: status post hysterectomy  History of abnormal Pap smear: no  Family history of uterine or ovarian cancer: no  Regular self breast exam: yes  History of abnormal mammogram: no  Family history of breast cancer: no  History of abnormal lipids: no  Menstrual History:  OB History        6    Para   6    Term   6       0    AB   0    Living   6       SAB   0    IAB   0    Ectopic   0    Multiple   0    Live Births   0                Menarche age: 15  Patient's last menstrual period was 2016  Review of Systems  Pertinent items are noted in HPI        Objective   No acute distress   /86   Ht 5' 3" (1 6 m)   Wt 67 6 kg (149 lb)   LMP 2016 Comment: UHCG NEG  BMI 26 39 kg/m²     General:   alert and oriented, in no acute distress, alert, appears stated age and cooperative   Heart: regular rate and rhythm, S1, S2 normal, no murmur, click, rub or gallop   Lungs: clear to auscultation bilaterally   Abdomen: soft, non-tender, without masses or organomegaly   Vulva: normal, Bartholin's, Urethra, Musselshell's normal, female escutcheon   Vagina: normal mucosa, normal discharge, no palpable nodules   Cervix: surgically absent, no cervical motion tenderness and no lesions   Uterus: surgically absent   Adnexa: normal adnexa and no mass, fullness, tenderness   Bilateral breast exam in the sitting and supine position with chaperone present, no visible or palpable breast lesions identified  No breast masses noted  No supraclavicular or axillary lymphadenopathy noted  No nipple discharge  Reviewed self-breast exam techniques     Rectal exam,  deferred

## 2022-04-25 DIAGNOSIS — Z79.4 TYPE 2 DIABETES MELLITUS WITHOUT COMPLICATION, WITH LONG-TERM CURRENT USE OF INSULIN (HCC): ICD-10-CM

## 2022-04-25 DIAGNOSIS — E11.9 TYPE 2 DIABETES MELLITUS WITHOUT COMPLICATION, WITH LONG-TERM CURRENT USE OF INSULIN (HCC): ICD-10-CM

## 2022-04-25 RX ORDER — DULAGLUTIDE 3 MG/.5ML
3 INJECTION, SOLUTION SUBCUTANEOUS WEEKLY
Qty: 6 ML | Refills: 1 | Status: SHIPPED | OUTPATIENT
Start: 2022-04-25 | End: 2022-06-01 | Stop reason: SDUPTHER

## 2022-04-25 NOTE — TELEPHONE ENCOUNTER
Javier Padilla  to Elio Galvin PA-C          4/23/22 7:52 PM  I just realized that I had filled my last refill for my trulicity back at the end of March unfortunately I ran out before I figured this out

## 2022-05-25 ENCOUNTER — TELEPHONE (OUTPATIENT)
Dept: ENDOCRINOLOGY | Facility: CLINIC | Age: 47
End: 2022-05-25

## 2022-06-01 ENCOUNTER — OFFICE VISIT (OUTPATIENT)
Dept: ENDOCRINOLOGY | Facility: CLINIC | Age: 47
End: 2022-06-01
Payer: COMMERCIAL

## 2022-06-01 VITALS
SYSTOLIC BLOOD PRESSURE: 120 MMHG | HEART RATE: 86 BPM | DIASTOLIC BLOOD PRESSURE: 80 MMHG | WEIGHT: 148 LBS | BODY MASS INDEX: 26.22 KG/M2 | HEIGHT: 63 IN

## 2022-06-01 DIAGNOSIS — Z79.4 TYPE 2 DIABETES MELLITUS WITHOUT COMPLICATION, WITH LONG-TERM CURRENT USE OF INSULIN (HCC): ICD-10-CM

## 2022-06-01 DIAGNOSIS — E11.9 TYPE 2 DIABETES MELLITUS WITHOUT COMPLICATION, WITH LONG-TERM CURRENT USE OF INSULIN (HCC): ICD-10-CM

## 2022-06-01 DIAGNOSIS — E11.00 TYPE 2 DIABETES MELLITUS WITH HYPEROSMOLARITY WITHOUT COMA, WITHOUT LONG-TERM CURRENT USE OF INSULIN (HCC): Primary | ICD-10-CM

## 2022-06-01 LAB — SL AMB POCT HEMOGLOBIN AIC: 5.2 (ref ?–6.5)

## 2022-06-01 PROCEDURE — 99213 OFFICE O/P EST LOW 20 MIN: CPT | Performed by: INTERNAL MEDICINE

## 2022-06-01 PROCEDURE — 83036 HEMOGLOBIN GLYCOSYLATED A1C: CPT | Performed by: INTERNAL MEDICINE

## 2022-06-01 RX ORDER — DULAGLUTIDE 3 MG/.5ML
3 INJECTION, SOLUTION SUBCUTANEOUS WEEKLY
Qty: 6 ML | Refills: 1 | Status: SHIPPED | OUTPATIENT
Start: 2022-06-01

## 2022-06-01 NOTE — PROGRESS NOTES
Est Patient Progress Note      Chief Complaint   Patient presents with    Diabetes Type 2      Referring Provider  Luisana Mi Md  [facility],  [address]      History of Present Illness:   Giovanni Paula is a 52 y o  female with a history of T2 diabetes with long term use of insulin seen in follow-up  Last seen by Telemed in 10/2021 by Abhinav Box    She recalls being diagnosed with diabetes as gestational DM with her last pregnancy 14yrs ago, which then improved  It returned one year later  She was initially treated with metformin, but she had diarrhea and headaches  This was replaced with lantus  Glipizide is in the chart, but she denies taking this or any other diabetes treatment  There were times when she went a week without insulin  PATTIE abs were checked and were undetectable    She denies complications, but is concerned about her vision  She denies neuropathy or renal disease, no MI ro CVA  Denies hospitalizations for severe hypoglycemic or severe hyperglycemic episodes  Her med list includes gabapentin, which she states is for hot flashes  Current regimen: Trulicity 3mg weekly  Injects in her abdomen and rotates sites       Recalled Home blood glucose readings:  Pre-bf:   Bed:  130s-150, occ 200s  Lowest Bg  80    Hypoglycemic episodes: none  Hypoglycemia symptoms: "shaking," anxious  Treatment of hypoglycemia: "tries to eat something" or drink juice      Diabetes education: saw RD  Diet: no changes  Activity: trying to work out at home    Ophthalmology: about 1 yr  Podiatry: does not see; self care  Dental: continues to see  COVID: primary series and booster     She is not taking losartan so it is removed from her list      Thyroid disorders: none  History of pancreatitis: no past history; alcohol use is infrequent (once every few weeks),   Cannot recall if she has had high trigs; s/p cholecystectomy    Patient Active Problem List   Diagnosis    S/P laparoscopic hysterectomy    Type II diabetes mellitus (HCC)    Elevated BP without diagnosis of hypertension      Past Medical History:   Diagnosis Date    Adenomyosis     Anxiety     Depression     Diabetes mellitus (HCC)     NIDDM     Headache     Hypertension     Irregular bleeding     Menorrhagia     Pelvic pain       Past Surgical History:   Procedure Laterality Date    CHOLECYSTECTOMY      ELBOW SURGERY      HYSTERECTOMY      CT HYSTEROSCOPY,W/ENDO BX N/A 12/20/2016    Procedure: DILATATION AND CURETTAGE (D&C) WITH HYSTEROSCOPY;  Surgeon: Jennie Kemp MD;  [facility];  Service: Gynecology    CT HYSTEROSCOPY,W/ENDO BX N/A 8/16/2016    Procedure: DILATATION AND CURETTAGE (D&C) WITH HYSTEROSCOPY POLYPECTOMY ;  Surgeon: Jennie Kemp MD;  [facility];  Service: Gynecology    CT HYSTEROSCOPY,W/ENDOMETRIAL ABLATION N/A 12/20/2016    Procedure: ABLATION ENDOMETRIAL Cynthea Peoples;  Surgeon: Jennie Kemp MD;  [facility];  Service: Gynecology    CT LAPAROSCOPY W TOT HYSTERECT UTERUS 250 GRAM OR LESS N/A 6/20/2017    Procedure: HYSTERECTOMY LAPAROSCOPIC TOTAL, BILATERAL SALPINGECTOMY, CYSTO;  Surgeon: Jennie Kemp MD;  [facility];  Service: Gynecology    TUBAL LIGATION      WISDOM TOOTH EXTRACTION        Family History   Problem Relation Age of Onset    No Known Problems Mother     Thyroid disease unspecified Father     No Known Problems Daughter     No Known Problems Maternal Grandmother     No Known Problems Maternal Grandfather     Lung cancer Paternal Grandmother     No Known Problems Paternal Grandfather     No Known Problems Daughter     No Known Problems Daughter     No Known Problems Maternal Aunt     No Known Problems Maternal Aunt     No Known Problems Maternal Aunt     No Known Problems Maternal Aunt     No Known Problems Maternal Aunt     No Known Problems Paternal Aunt      Social History     Tobacco Use    Smoking status: Never Smoker  Smokeless tobacco: Never Used   Substance Use Topics    Alcohol use:  Yes     Alcohol/week: 3 0 standard drinks     Types: 3 Glasses of wine per week     Comment: 1x wk      Allergies   Allergen Reactions    Metformin Diarrhea and Headache         Current Outpatient Medications:     Blood Glucose Calibration (OneTouch Verio) SOLN, Use as needed to calibrate test strips, Disp: 1 each, Rfl: 1    Blood Glucose Monitoring Suppl (OneTouch Verio) w/Device KIT, Use 2 (two) times a day, Disp: 1 kit, Rfl: 0    Dulaglutide (Trulicity) 3 FQ/3 8OC SOPN, Inject 0 5 mL (3 mg total) under the skin once a week, Disp: 6 mL, Rfl: 1    Lancets (onetouch ultrasoft) lancets, Use 2 (two) times a day, Disp: 200 each, Rfl: 1    OneTouch Verio test strip, USE 1 EACH 2 (TWO) TIMES A DAY USE AS INSTRUCTED, Disp: 200 strip, Rfl: 3    amoxicillin (AMOXIL) 875 mg tablet, TAKE 1 TABLET BY MOUTH EVERY 12 HOURS UNTIL FINISHED (Patient not taking: No sig reported), Disp: , Rfl:     gabapentin (NEURONTIN) 300 mg capsule, Take 1 capsule (300 mg total) by mouth 2 (two) times a day (Patient not taking: Reported on 6/1/2022), Disp: 60 capsule, Rfl: 2    GaviLyte-G 236 g solution, TAKE 4000 MILLILITER AS DIRECTED, FOLLOW INSTRUCTIONS PROVIDED BY OFFICE (Patient not taking: Reported on 6/1/2022), Disp: , Rfl:     ibuprofen (MOTRIN) 600 mg tablet, Take 1 tablet by mouth every 6 (six) hours as needed for mild pain (Patient not taking: Reported on 6/1/2022), Disp: 30 tablet, Rfl: 0    ibuprofen (MOTRIN) 800 mg tablet, Take 800 mg by mouth every 6 (six) hours as needed (Patient not taking: Reported on 6/1/2022), Disp: , Rfl:     Linzess 290 MCG CAPS, Take 1 capsule by mouth daily (Patient not taking: Reported on 6/1/2022), Disp: , Rfl:     oxyCODONE-acetaminophen (PERCOCET) 5-325 mg per tablet, Take 1 tablet by mouth every 6 (six) hours as needed (Patient not taking: Reported on 6/1/2022), Disp: , Rfl:     polyethylene glycol (GLYCOLAX) 17 GM/SCOOP powder, Take 17 g by mouth daily (Patient not taking: Reported on 6/1/2022), Disp: , Rfl:   Review of Systems   Constitutional: Positive for unexpected weight change  HENT: Negative for hearing loss, trouble swallowing and voice change  Eyes: Negative for visual disturbance  Gastrointestinal: Positive for constipation  Negative for diarrhea and nausea  Endocrine: Negative for polydipsia and polyuria  Musculoskeletal: Negative for gait problem  Neurological: Negative for tremors and numbness  Psychiatric/Behavioral: The patient is not nervous/anxious  see also HPI    Physical Exam:  Body mass index is 26 22 kg/m²  /80   Pulse 86   Ht 5' 3" (1 6 m)   Wt 67 1 kg (148 lb)   LMP 12/14/2016 Comment: CG NEG  BMI 26 22 kg/m²    Wt Readings from Last 3 Encounters:   06/01/22 67 1 kg (148 lb)   04/22/22 67 6 kg (149 lb)   06/21/21 75 8 kg (167 lb)       GEN: NAD    Physical Exam   Gen: appears well-developed and well-nourished  No apparent distress  Head: Normocephalic and atraumatic  Eyes: no stare or proptosis, no periorbital edema  E/N/M: mask in place, hearing grossly intact  Neck: range of motion nl  Pulmonary/Chest: breathing  comfortably, no accessory muscle use, effort normal    Musculoskeletal: moves all extremities, gait nl  Neurological: alert and oriented to person, place, and time  No upper ext tremor appreciated  Skin: does not appear diaphoretic  Psychiatric: normal mood and affect; behavior is normal; no gross lapses in memory, answer questions appropriately    Patient's shoes and socks removed  Right Foot/Ankle   Right Foot Inspection  Skin Exam: skin normal, skin intact, callus (great toe) and callus (great toe)  No dry skin, no warmth, no erythema, no maceration, no abnormal color, no pre-ulcer and no ulcer  Toe Exam: ROM and strength within normal limits       Sensory   Vibration: intact  Monofilament testing: intact    Vascular  Capillary refills: < 3 seconds  The right DP pulse is 2+  Left Foot/Ankle  Left Foot Inspection  Skin Exam: skin normal, skin intact and callus (great toe)  No dry skin, no warmth, no erythema, no maceration, normal color, no pre-ulcer and no ulcer  Toe Exam: ROM and strength within normal limits  Sensory   Vibration: diminished  Monofilament testing: intact    Vascular  Capillary refills: < 3 seconds  The left DP pulse is 2+  Assign Risk Category  No deformity present  No loss of protective sensation  No weak pulses  Risk: 0        Labs:     Lab Results   Component Value Date    HGBA1C 5 2 06/01/2022          Lab Results   Component Value Date    CREATININE 0 85 07/16/2021    CREATININE 0 76 02/06/2021    BUN 13 07/16/2021    K 3 9 07/16/2021     07/16/2021    CO2 27 07/16/2021     eGFR   Date Value Ref Range Status   07/16/2021 82 ml/min/1 73sq m Final     No components found for: [facility]    Lab Results   Component Value Date    HDL 55 02/06/2021    TRIG 116 02/06/2021       Lab Results   Component Value Date    ALT 22 02/06/2021    AST 13 02/06/2021    ALKPHOS 59 02/06/2021           Impression:  1  Type 2 diabetes mellitus with hyperosmolarity without coma, without long-term current use of insulin ([address] )    2  Type 2 diabetes mellitus without complication, with long-term current use of insulin ([facility])           Plan:    Jamel was seen today for diabetes type 2  Diagnoses and all orders for this visit:    Type 2 diabetes mellitus with hyperosmolarity without coma, without long-term current use of insulin ([facility])  -     POCT hemoglobin A1c  -     Comprehensive metabolic panel Lab Collect; Future  -     CBC and Platelet- Lab Collect; Future  -     Microalbumin / creatinine urine ratio; Future  -     Lipid panel Lab Collect Lab Collect;  Future    Type 2 diabetes mellitus without complication, with long-term current use of insulin ([facility])  -     Dulaglutide (Trulicity) 3 UD/1 3EI SOPN; Inject 0 5 mL (3 mg total) under the skin once a week        1  Diabetes mellitus, Type 2:  Her control is excellent with the Trulicity 3mg daily  She is having a colonoscopy for constipation and can continue this medication  No change snow, but she is due for labs including CMP, urine MAC, and lipids, which are ordered          Plan RTC in 6mos  Discussed with the patient and all questioned fully answered  She will call me if any problems arise      Counseled patient on diagnostic results, prognosis, risk and benefit of treatment options, instruction for management, importance of treatment compliance, Risk  factor reduction and impressions      Inocencio Hamman, MD

## 2022-06-14 ENCOUNTER — HOSPITAL ENCOUNTER (OUTPATIENT)
Dept: MAMMOGRAPHY | Facility: MEDICAL CENTER | Age: 47
Discharge: HOME | End: 2022-06-14
Payer: COMMERCIAL

## 2022-06-14 VITALS — WEIGHT: 147.93 LBS | BODY MASS INDEX: 26.21 KG/M2 | HEIGHT: 63 IN

## 2022-06-14 DIAGNOSIS — Z12.31 ENCOUNTER FOR SCREENING MAMMOGRAM FOR BREAST CANCER: ICD-10-CM

## 2022-06-14 PROCEDURE — 77067 SCR MAMMO BI INCL CAD: CPT

## 2022-06-14 PROCEDURE — 77063 BREAST TOMOSYNTHESIS BI: CPT

## 2022-10-18 ENCOUNTER — TELEPHONE (OUTPATIENT)
Dept: ENDOCRINOLOGY | Facility: CLINIC | Age: 47
End: 2022-10-18

## 2022-10-18 NOTE — TELEPHONE ENCOUNTER
Jamel PAINTER Case ID: [id number] - Rx #: [id number]  Need help? Call us at [phone]    Status   [email or web address]    Drug    Trulicity 8WN/0 0CA pen-injectors   Form    [facility] Prior Authorization Request Form

## 2022-11-14 DIAGNOSIS — E13.9 DIABETES 1.5, MANAGED AS TYPE 1 (HCC): ICD-10-CM

## 2022-11-14 RX ORDER — BLOOD SUGAR DIAGNOSTIC
1 STRIP MISCELLANEOUS 2 TIMES DAILY
Qty: 200 STRIP | Refills: 3 | Status: SHIPPED | OUTPATIENT
Start: 2022-11-14

## 2023-02-08 DIAGNOSIS — E11.9 TYPE 2 DIABETES MELLITUS WITHOUT COMPLICATION, WITH LONG-TERM CURRENT USE OF INSULIN (HCC): ICD-10-CM

## 2023-02-08 DIAGNOSIS — Z79.4 TYPE 2 DIABETES MELLITUS WITHOUT COMPLICATION, WITH LONG-TERM CURRENT USE OF INSULIN (HCC): ICD-10-CM

## 2023-02-08 RX ORDER — DULAGLUTIDE 3 MG/.5ML
3 INJECTION, SOLUTION SUBCUTANEOUS WEEKLY
Qty: 2 ML | Refills: 1 | Status: SHIPPED | OUTPATIENT
Start: 2023-02-08

## 2023-03-31 DIAGNOSIS — Z79.4 TYPE 2 DIABETES MELLITUS WITHOUT COMPLICATION, WITH LONG-TERM CURRENT USE OF INSULIN (HCC): ICD-10-CM

## 2023-03-31 DIAGNOSIS — E11.9 TYPE 2 DIABETES MELLITUS WITHOUT COMPLICATION, WITH LONG-TERM CURRENT USE OF INSULIN (HCC): ICD-10-CM

## 2023-03-31 RX ORDER — DULAGLUTIDE 3 MG/.5ML
INJECTION, SOLUTION SUBCUTANEOUS
Qty: 2 ML | Refills: 1 | Status: SHIPPED | OUTPATIENT
Start: 2023-03-31

## 2023-03-31 NOTE — TELEPHONE ENCOUNTER
Requested medication(s) are due for refill today: Yes  Patient has already received a courtesy refill: No  Other reason request has been forwarded to provider: guidelines failed

## 2023-04-25 ENCOUNTER — ANNUAL EXAM (OUTPATIENT)
Dept: GYNECOLOGY | Facility: CLINIC | Age: 48
End: 2023-04-25

## 2023-04-25 VITALS
HEIGHT: 63 IN | BODY MASS INDEX: 27.39 KG/M2 | WEIGHT: 154.6 LBS | DIASTOLIC BLOOD PRESSURE: 82 MMHG | SYSTOLIC BLOOD PRESSURE: 122 MMHG

## 2023-04-25 DIAGNOSIS — Z12.31 ENCOUNTER FOR SCREENING MAMMOGRAM FOR BREAST CANCER: ICD-10-CM

## 2023-04-25 DIAGNOSIS — Z90.79 STATUS POST BILATERAL SALPINGECTOMY: ICD-10-CM

## 2023-04-25 DIAGNOSIS — Z90.710 S/P LAPAROSCOPIC HYSTERECTOMY: ICD-10-CM

## 2023-04-25 DIAGNOSIS — Z86.010 HISTORY OF COLONOSCOPY WITH POLYPECTOMY: ICD-10-CM

## 2023-04-25 DIAGNOSIS — Z01.419 WOMEN'S ANNUAL ROUTINE GYNECOLOGICAL EXAMINATION: ICD-10-CM

## 2023-04-25 DIAGNOSIS — Z98.890 HISTORY OF COLONOSCOPY WITH POLYPECTOMY: ICD-10-CM

## 2023-04-25 DIAGNOSIS — E13.9 DIABETES 1.5, MANAGED AS TYPE 2 (HCC): ICD-10-CM

## 2023-04-25 NOTE — PROGRESS NOTES
"Assessment   Annual well woman exam  Status post laparoscopic hysterectomy with bilateral salpingectomy  History of colonoscopy with polypectomy in   Diabetes managed as type II  Reviewed self breast exam techniques        Plan   Screening mammogram ordered  Colon cancer screening up-to-date  Pap smear no longer indicated, hysterectomy   All questions answered  Subjective here for annual exam     Jamel Montgomery is a 50 y o  female who presents for annual exam  Periods are absent since her hysterectomy  Denies any pelvic pain symptoms denies any unusual vaginal discharge odor or itching  She is sexually active denies any dyspareunia symptoms or vaginal dryness  The patient reports that there is not domestic violence in her life  Current contraception: status post hysterectomy  History of abnormal Pap smear: no  Family history of uterine or ovarian cancer: no  Regular self breast exam: yes  History of abnormal mammogram: no  Family history of breast cancer: no  History of abnormal lipids: no  Menstrual History:  OB History        6    Para   6    Term   6       0    AB   0    Living   6       SAB   0    IAB   0    Ectopic   0    Multiple   0    Live Births   0                Menarche age: 15  Patient's last menstrual period was 2016  Review of Systems  Pertinent items are noted in HPI        Objective no acute distress   /82 (BP Location: Right arm, Patient Position: Sitting, Cuff Size: Standard)   Ht 5' 3\" (1 6 m)   Wt 70 1 kg (154 lb 9 6 oz)   LMP 2016 Comment: CG NEG  BMI 27 39 kg/m²     General:   alert and oriented, in no acute distress, alert, appears stated age and cooperative   Heart: regular rate and rhythm, S1, S2 normal, no murmur, click, rub or gallop   Lungs: clear to auscultation bilaterally   Abdomen: soft, non-tender, without masses or organomegaly   Vulva: normal, Bartholin's, Urethra, [place]'s normal, female escutcheon   Vagina: normal mucosa, " normal discharge, no palpable nodules   Cervix: surgically absent   Uterus: surgically absent   Adnexa: normal adnexa no masses identified   Bilateral breast exam in the sitting and supine position with chaperone present, no visible or palpable breast lesions identified  No breast masses noted  No supraclavicular or axillary lymphadenopathy noted  No nipple discharge  Reviewed self-breast exam techniques     Rectal exam,  deferred

## 2023-06-14 ENCOUNTER — OFFICE VISIT (OUTPATIENT)
Dept: ENDOCRINOLOGY | Facility: CLINIC | Age: 48
End: 2023-06-14
Payer: MEDICARE

## 2023-06-14 VITALS
WEIGHT: 151 LBS | DIASTOLIC BLOOD PRESSURE: 90 MMHG | BODY MASS INDEX: 26.75 KG/M2 | HEIGHT: 63 IN | SYSTOLIC BLOOD PRESSURE: 102 MMHG | HEART RATE: 88 BPM

## 2023-06-14 DIAGNOSIS — Z79.4 TYPE 2 DIABETES MELLITUS WITHOUT COMPLICATION, WITH LONG-TERM CURRENT USE OF INSULIN (HCC): Primary | ICD-10-CM

## 2023-06-14 DIAGNOSIS — E11.9 TYPE 2 DIABETES MELLITUS WITHOUT COMPLICATION, WITH LONG-TERM CURRENT USE OF INSULIN (HCC): Primary | ICD-10-CM

## 2023-06-14 DIAGNOSIS — K59.00 CONSTIPATION, UNSPECIFIED CONSTIPATION TYPE: ICD-10-CM

## 2023-06-14 DIAGNOSIS — R03.0 ELEVATED BP WITHOUT DIAGNOSIS OF HYPERTENSION: ICD-10-CM

## 2023-06-14 LAB — SL AMB POCT HEMOGLOBIN AIC: 5.5 (ref ?–6.5)

## 2023-06-14 PROCEDURE — 83036 HEMOGLOBIN GLYCOSYLATED A1C: CPT | Performed by: PHYSICIAN ASSISTANT

## 2023-06-14 PROCEDURE — 99214 OFFICE O/P EST MOD 30 MIN: CPT | Performed by: PHYSICIAN ASSISTANT

## 2023-06-14 RX ORDER — DULAGLUTIDE 1.5 MG/.5ML
1.5 INJECTION, SOLUTION SUBCUTANEOUS
Qty: 6 ML | Refills: 1 | Status: SHIPPED | OUTPATIENT
Start: 2023-06-14

## 2023-06-14 NOTE — PROGRESS NOTES
Established Patient Progress Note      Chief Complaint   Patient presents with   • Diabetes Type 2        Impression & Plan:    Problem List Items Addressed This Visit        Endocrine    Type II diabetes mellitus ([address] ) - Primary     Diabetes is under good control  Due to constipation and low R7H will reduce Trulicity to 1 5 mg weekly  She has not had CMP since 2021, after lab testing is complete consider treatment with statin  Lab Results   Component Value Date    HGBA1C 5 5 06/14/2023            Relevant Medications    dulaglutide (Trulicity) 1 5 JG/1 7TJ injection    Other Relevant Orders    POCT hemoglobin A1c    Comprehensive metabolic panel    TSH, 3rd generation    T4, free       Other    Elevated BP without diagnosis of hypertension     Diastolic blood pressure elevated today but typically blood pressure is under good control we will continue to monitor        Other Visit Diagnoses     Constipation, unspecified constipation type        Relevant Orders    Comprehensive metabolic panel    TSH, 3rd generation    T4, free          Orders Placed This Encounter   Procedures   • Comprehensive metabolic panel     This is a patient instruction: Patient fasting for 8 hours or longer recommended  Standing Status:   Future     Standing Expiration Date:   12/14/2023   • TSH, 3rd generation     This is a patient instruction: This test is non-fasting  Please drink two glasses of water morning of bloodwork  Standing Status:   Future     Standing Expiration Date:   12/14/2023   • T4, free     Standing Status:   Future     Standing Expiration Date:   12/14/2023   • POCT hemoglobin A1c       History of Present Illness:   Duncan Burroughs is a 50 y o  female with a history of type 2 diabetes without long term use of insulin since about 15 years ago, she was previously treated with insulin  Reports complications of none  Denies recent illness or hospitalizations   Denies recent severe hypoglycemic or severe hyperglycemic episodes  Denies any issues with her current regimen  home glucose monitoring: are performed sporadically and readings range   Diet has been okay but she is not exercising due to being tired after a long workday  IN the past she has not tolerated metformin  She was last seen by Dr Anibal Huynh about 1 year ago       Current regimen: Trulicity 3 mg weekly    Last Eye Exam: 2 months ago at [place], [place] by report  Last Foot Exam: Today at visit, no problems    Patient Active Problem List   Diagnosis   • S/P laparoscopic hysterectomy   • Type II diabetes mellitus ([facility] [address] )   • Elevated BP without diagnosis of hypertension      Past Medical History:   Diagnosis Date   • Adenomyosis    • Anxiety    • Depression    • Diabetes mellitus (HCC)     NIDDM    • Headache    • Hypertension    • Irregular bleeding    • Menorrhagia    • Pelvic pain       Past Surgical History:   Procedure Laterality Date   • CHOLECYSTECTOMY     • ELBOW SURGERY     • HYSTERECTOMY     • NV HYSTEROSCOPY BX ENDOMETRIUM&/POLYPC W/WO D&C N/A 12/20/2016    Procedure: DILATATION AND CURETTAGE (D&C) WITH HYSTEROSCOPY;  Surgeon: Agnes Vilal MD;  Location: [facility];  Service: Gynecology   • NV HYSTEROSCOPY BX ENDOMETRIUM&/POLYPC W/WO D&C N/A 8/16/2016    Procedure: DILATATION AND CURETTAGE (D&C) WITH HYSTEROSCOPY POLYPECTOMY ;  Surgeon: Agnes Villa MD;  Location[facility];  Service: Gynecology   • NV HYSTEROSCOPY ENDOMETRIAL ABLATION N/A 12/20/2016    Procedure: Steffanie Shaeme;  Surgeon: Agnes Villa MD;  [facility];  Service: Gynecology   • NV LAPAROSCOPY W TOTAL HYSTERECTOMY UTERUS 250 GM/< N/A 6/20/2017    Procedure: HYSTERECTOMY LAPAROSCOPIC TOTAL, BILATERAL SALPINGECTOMY, CYSTO;  Surgeon: Agnes Villa MD;  Location[facility];  Service: Gynecology   • TUBAL LIGATION     • WISDOM TOOTH EXTRACTION        Family History   Problem Relation Age of Onset   • No Known Problems Mother    • Thyroid disease unspecified Father    • No Known Problems Daughter    • No Known Problems Daughter    • No Known Problems Daughter    • No Known Problems Maternal Grandmother    • No Known Problems Maternal Grandfather    • Lung cancer Paternal Grandmother         age unknown   • No Known Problems Paternal Grandfather    • No Known Problems Maternal Aunt    • No Known Problems Maternal Aunt    • No Known Problems Maternal Aunt    • No Known Problems Maternal Aunt    • No Known Problems Maternal Aunt    • No Known Problems Paternal Aunt      Social History     Tobacco Use   • Smoking status: Never   • Smokeless tobacco: Never   Substance Use Topics   • Alcohol use: Yes     Alcohol/week: 3 0 standard drinks of alcohol     Types: 3 Glasses of wine per week     Allergies   Allergen Reactions   • Metformin Diarrhea and Headache         Current Outpatient Medications:   •  Blood Glucose Calibration (OneTouch Verio) SOLN, Use as needed to calibrate test strips, Disp: 1 each, Rfl: 1  •  Blood Glucose Monitoring Suppl (OneTouch Verio) w/Device KIT, Use 2 (two) times a day, Disp: 1 kit, Rfl: 0  •  dulaglutide (Trulicity) 1 5 OH/7 2TV injection, Inject 0 5 mL (1 5 mg total) under the skin every 7 days, Disp: 6 mL, Rfl: 1  •  Lancets (onetouch ultrasoft) lancets, Use 2 (two) times a day, Disp: 200 each, Rfl: 1  •  OneTouch Verio test strip, USE 1 EACH 2 (TWO) TIMES A DAY USE AS INSTRUCTED, Disp: 200 strip, Rfl: 3    Review of Systems   Constitutional: Negative for activity change, appetite change, chills, diaphoresis, fatigue, fever and unexpected weight change  HENT: Negative for trouble swallowing and voice change  Eyes: Negative for visual disturbance  Respiratory: Negative for shortness of breath  Cardiovascular: Negative for chest pain and palpitations  Gastrointestinal: Positive for constipation (BM about 2x per week)  Negative for abdominal pain and diarrhea     Endocrine: Negative for cold intolerance, heat intolerance, "polydipsia, polyphagia and polyuria  Genitourinary: Negative for frequency and menstrual problem  Musculoskeletal: Negative for arthralgias and myalgias  Skin: Negative for rash  Allergic/Immunologic: Negative for food allergies  Neurological: Negative for dizziness and tremors  Hematological: Negative for adenopathy  Psychiatric/Behavioral: Negative for sleep disturbance  All other systems reviewed and are negative  Physical Exam:  Body mass index is 26 75 kg/m²  /90 (BP Location: Left arm, Patient Position: Sitting, Cuff Size: Standard)   Pulse 88   Ht 5' 3\" (1 6 m)   Wt 68 5 kg (151 lb)   LMP 12/12/2016 (Approximate) Comment: ablation dec 2016  BMI 26 75 kg/m²    Wt Readings from Last 3 Encounters:   06/14/23 68 5 kg (151 lb)   04/25/23 70 1 kg (154 lb 9 6 oz)   06/14/22 67 1 kg (147 lb 14 9 oz)       Physical Exam  Vitals reviewed  Constitutional:       General: She is not in acute distress  Appearance: She is well-developed  HENT:      Head: Normocephalic and atraumatic  Eyes:      Conjunctiva/sclera: Conjunctivae normal       Pupils: Pupils are equal, round, and reactive to light  Neck:      Thyroid: No thyromegaly  Cardiovascular:      Rate and Rhythm: Normal rate and regular rhythm  Pulses: no weak pulses          Dorsalis pedis pulses are 2+ on the right side and 2+ on the left side  Posterior tibial pulses are 2+ on the right side and 2+ on the left side  Heart sounds: Normal heart sounds  Pulmonary:      Effort: Pulmonary effort is normal  No respiratory distress  Breath sounds: Normal breath sounds  No wheezing or rales  Abdominal:      General: Bowel sounds are normal  There is no distension  Palpations: Abdomen is soft  Tenderness: There is no abdominal tenderness  Musculoskeletal:         General: Normal range of motion  Cervical back: Normal range of motion and neck supple     Feet:      Right foot:      Skin " integrity: No ulcer, skin breakdown, erythema, warmth, callus or dry skin  Left foot:      Skin integrity: No ulcer, skin breakdown, erythema, warmth, callus or dry skin  Skin:     General: Skin is warm and dry  Neurological:      Mental Status: She is alert and oriented to person, place, and time  Patient's shoes and socks removed  Right Foot/Ankle   Right Foot Inspection  Skin Exam: skin normal and skin intact  No dry skin, no warmth, no callus, no erythema, no maceration, no abnormal color, no pre-ulcer, no ulcer and no callus  Toe Exam: ROM and strength within normal limits  No swelling, no tenderness, erythema and  no right toe deformity    Sensory   Monofilament testing: intact    Vascular  Capillary refills: < 3 seconds  The right DP pulse is 2+  The right PT pulse is 2+  Left Foot/Ankle  Left Foot Inspection  Skin Exam: skin normal and skin intact  No dry skin, no warmth, no erythema, no maceration, normal color, no pre-ulcer, no ulcer and no callus  Toe Exam: ROM and strength within normal limits  No swelling, no tenderness, no erythema and no left toe deformity  Sensory   Monofilament testing: intact    Vascular  Capillary refills: < 3 seconds  The left DP pulse is 2+  The left PT pulse is 2+       Assign Risk Category  No deformity present  No loss of protective sensation  No weak pulses  Risk: 0      Labs:   Lab Results   Component Value Date    HGBA1C 5 5 06/14/2023    HGBA1C 5 2 06/01/2022    HGBA1C 6 3 (H) 07/16/2021     Lab Results   Component Value Date    BUN 13 07/16/2021     07/16/2021    CO2 27 07/16/2021    CREATININE 0 85 07/16/2021    CREATININE 0 76 02/06/2021    K 3 9 07/16/2021     eGFR   Date Value Ref Range Status   07/16/2021 82 ml/min/1 73sq m Final     Lab Results   Component Value Date    HDL 55 02/06/2021    TRIG 116 02/06/2021     Lab Results   Component Value Date    ALKPHOS 59 02/06/2021    ALT 22 02/06/2021    AST 13 02/06/2021     Lab "Results   Component Value Date    [id number] 0 987 02/06/2021    [id number] 1 018 07/15/2016     No results found for: \"FREET4\", \"TSI\"          Discussed with the patient and all questioned fully answered  She will call me if any problems arise  Follow-up appointment in 6 months  Counseled patient on diagnostic results, prognosis, risk and benefit of treatment options, instruction for management, importance of treatment compliance, Risk  factor reduction and impressions    There are no Patient Instructions on file for this visit      Keagan Wood PA-C  "

## 2023-06-14 NOTE — ASSESSMENT & PLAN NOTE
Diabetes is under good control  Due to constipation and low M6H will reduce Trulicity to 1 5 mg weekly  She has not had CMP since 2021, after lab testing is complete consider treatment with statin     Lab Results   Component Value Date    HGBA1C 5 5 06/14/2023

## 2023-06-14 NOTE — ASSESSMENT & PLAN NOTE
Diastolic blood pressure elevated today but typically blood pressure is under good control we will continue to monitor

## 2023-06-23 ENCOUNTER — APPOINTMENT (OUTPATIENT)
Dept: LAB | Facility: HOSPITAL | Age: 48
End: 2023-06-23
Payer: MEDICARE

## 2023-06-23 DIAGNOSIS — K59.00 CONSTIPATION, UNSPECIFIED CONSTIPATION TYPE: ICD-10-CM

## 2023-06-23 DIAGNOSIS — Z79.4 TYPE 2 DIABETES MELLITUS WITHOUT COMPLICATION, WITH LONG-TERM CURRENT USE OF INSULIN (HCC): ICD-10-CM

## 2023-06-23 DIAGNOSIS — E11.9 TYPE 2 DIABETES MELLITUS WITHOUT COMPLICATION, WITH LONG-TERM CURRENT USE OF INSULIN (HCC): ICD-10-CM

## 2023-06-23 LAB
ALBUMIN SERPL BCP-MCNC: 3.9 G/DL (ref 3.5–5)
ALP SERPL-CCNC: 43 U/L (ref 34–104)
ALT SERPL W P-5'-P-CCNC: 6 U/L (ref 7–52)
ANION GAP SERPL CALCULATED.3IONS-SCNC: 7 MMOL/L
AST SERPL W P-5'-P-CCNC: 10 U/L (ref 13–39)
BILIRUB SERPL-MCNC: 0.35 MG/DL (ref 0.2–1)
BUN SERPL-MCNC: 10 MG/DL (ref 5–25)
CALCIUM SERPL-MCNC: 8.6 MG/DL (ref 8.4–10.2)
CHLORIDE SERPL-SCNC: 105 MMOL/L (ref 96–108)
CO2 SERPL-SCNC: 25 MMOL/L (ref 21–32)
CREAT SERPL-MCNC: 0.83 MG/DL (ref 0.6–1.3)
GFR SERPL CREATININE-BSD FRML MDRD: 83 ML/MIN/1.73SQ M
GLUCOSE P FAST SERPL-MCNC: 99 MG/DL (ref 65–99)
POTASSIUM SERPL-SCNC: 3.7 MMOL/L (ref 3.5–5.3)
PROT SERPL-MCNC: 6.3 G/DL (ref 6.4–8.4)
SODIUM SERPL-SCNC: 137 MMOL/L (ref 135–147)
T4 FREE SERPL-MCNC: 0.82 NG/DL (ref 0.61–1.12)
TSH SERPL DL<=0.05 MIU/L-ACNC: 1.4 UIU/ML (ref 0.45–4.5)

## 2023-06-23 PROCEDURE — 80053 COMPREHEN METABOLIC PANEL: CPT

## 2023-06-23 PROCEDURE — 84443 ASSAY THYROID STIM HORMONE: CPT

## 2023-06-23 PROCEDURE — 36415 COLL VENOUS BLD VENIPUNCTURE: CPT

## 2023-06-23 PROCEDURE — 84439 ASSAY OF FREE THYROXINE: CPT

## 2023-06-27 ENCOUNTER — HOSPITAL ENCOUNTER (OUTPATIENT)
Dept: MAMMOGRAPHY | Facility: MEDICAL CENTER | Age: 48
Discharge: HOME | End: 2023-06-27
Payer: MEDICARE

## 2023-06-27 VITALS — HEIGHT: 63 IN | WEIGHT: 151.01 LBS | BODY MASS INDEX: 26.76 KG/M2

## 2023-06-27 DIAGNOSIS — Z12.31 ENCOUNTER FOR SCREENING MAMMOGRAM FOR MALIGNANT NEOPLASM OF BREAST: ICD-10-CM

## 2023-06-27 DIAGNOSIS — Z12.31 ENCOUNTER FOR SCREENING MAMMOGRAM FOR BREAST CANCER: ICD-10-CM

## 2023-06-27 PROCEDURE — 77063 BREAST TOMOSYNTHESIS BI: CPT

## 2023-06-27 PROCEDURE — 77067 SCR MAMMO BI INCL CAD: CPT

## 2023-07-11 ENCOUNTER — HOSPITAL ENCOUNTER (EMERGENCY)
Facility: HOSPITAL | Age: 48
Discharge: HOME | End: 2023-07-11
Attending: EMERGENCY MEDICINE | Admitting: EMERGENCY MEDICINE
Payer: MEDICARE

## 2023-07-11 VITALS
BODY MASS INDEX: 27.26 KG/M2 | OXYGEN SATURATION: 98 % | DIASTOLIC BLOOD PRESSURE: 89 MMHG | RESPIRATION RATE: 16 BRPM | TEMPERATURE: 98.5 F | WEIGHT: 153.88 LBS | SYSTOLIC BLOOD PRESSURE: 146 MMHG | HEART RATE: 89 BPM

## 2023-07-11 DIAGNOSIS — M62.838 TRAPEZIUS MUSCLE SPASM: ICD-10-CM

## 2023-07-11 DIAGNOSIS — M54.2 NECK PAIN ON RIGHT SIDE: Primary | ICD-10-CM

## 2023-07-11 RX ORDER — KETOROLAC TROMETHAMINE 30 MG/ML
15 INJECTION, SOLUTION INTRAMUSCULAR; INTRAVENOUS ONCE
Status: COMPLETED | OUTPATIENT
Start: 2023-07-11 | End: 2023-07-11

## 2023-07-11 RX ORDER — METHOCARBAMOL 500 MG/1
500 TABLET, FILM COATED ORAL 2 TIMES DAILY
Qty: 20 TABLET | Refills: 0 | Status: SHIPPED | OUTPATIENT
Start: 2023-07-11

## 2023-07-11 RX ORDER — NAPROXEN 500 MG/1
500 TABLET ORAL 2 TIMES DAILY WITH MEALS
Qty: 20 TABLET | Refills: 0 | Status: SHIPPED | OUTPATIENT
Start: 2023-07-11 | End: 2024-07-10

## 2023-07-11 RX ORDER — LIDOCAINE 50 MG/G
1 PATCH TOPICAL EVERY 24 HOURS
Qty: 15 PATCH | Refills: 0 | Status: SHIPPED | OUTPATIENT
Start: 2023-07-11 | End: 2023-07-26

## 2023-07-11 RX ORDER — LIDOCAINE 50 MG/G
1 PATCH TOPICAL ONCE
Status: DISCONTINUED | OUTPATIENT
Start: 2023-07-11 | End: 2023-07-11 | Stop reason: HOSPADM

## 2023-07-11 RX ADMIN — LIDOCAINE 1 PATCH: 50 PATCH TOPICAL at 18:15

## 2023-07-11 RX ADMIN — KETOROLAC TROMETHAMINE 15 MG: 30 INJECTION, SOLUTION INTRAMUSCULAR; INTRAVENOUS at 18:13

## 2023-07-11 NOTE — ED PROVIDER NOTES
History  Chief Complaint   Patient presents with   • Neck Pain     R sided neck and head pain for 3 days. Tylenol 4 hours pta      80-year-old female past medical history of type 2 diabetes mellitus, headaches, hypertension, anxiety, depression presents to emergency department complaining of right-sided neck pain. Denies any acute trauma. Denies headache pain just starts where head/neck connect. History provided by:  Patient  Neck Pain  Pain location:  Occipital region and R side  Pain radiates to:  R shoulder  Duration:  3 days  Chronicity:  New  Context: not fall, not jumping from heights, not lifting a heavy object, not MVC and not recent injury    Relieved by:  Nothing  Worsened by:  Position and twisting  Ineffective treatments:  Analgesics  Associated symptoms: no bladder incontinence, no bowel incontinence, no chest pain, no fever, no headaches, no leg pain, no numbness, no paresis, no photophobia, no syncope, no tingling, no visual change, no weakness and no weight loss    Risk factors: no hx of head and neck radiation, no hx of osteoporosis, no hx of spinal trauma, no recent epidural, no recent head injury and no recurrent falls    Risk factors comment:  She does report increased stress       Prior to Admission Medications   Prescriptions Last Dose Informant Patient Reported? Taking?    Blood Glucose Calibration (OneTouch Verio) SOLN  Self No No   Sig: Use as needed to calibrate test strips   Blood Glucose Monitoring Suppl (OneTouch Verio) w/Device KIT  Self No No   Sig: Use 2 (two) times a day   Lancets (onetouch ultrasoft) lancets  Self No No   Sig: Use 2 (two) times a day   OneTouch Verio test strip  Self No No   Sig: USE 1 EACH 2 (TWO) TIMES A DAY USE AS INSTRUCTED   dulaglutide (Trulicity) 1.5 ZK/1.6HP injection   No No   Sig: Inject 0.5 mL (1.5 mg total) under the skin every 7 days      Facility-Administered Medications: None       Past Medical History:   Diagnosis Date   • Adenomyosis    • Anxiety    • Depression    • Diabetes mellitus (HCC)     NIDDM    • Headache    • Hypertension    • Irregular bleeding    • Menorrhagia    • Pelvic pain        Past Surgical History:   Procedure Laterality Date   • CHOLECYSTECTOMY     • ELBOW SURGERY     • HYSTERECTOMY     • MO HYSTEROSCOPY BX ENDOMETRIUM&/POLYPC W/WO D&C N/A 12/20/2016    Procedure: DILATATION AND CURETTAGE (D&C) WITH HYSTEROSCOPY;  Surgeon: Yanelis Addison MD;  [facility];  Service: Gynecology   • MO HYSTEROSCOPY BX ENDOMETRIUM&/POLYPC W/WO D&C N/A 8/16/2016    Procedure: DILATATION AND CURETTAGE (D&C) WITH HYSTEROSCOPY POLYPECTOMY ;  Surgeon: Yanelis Addison MD;  [facility];  Service: Gynecology   • MO HYSTEROSCOPY ENDOMETRIAL ABLATION N/A 12/20/2016    Procedure: Toshia Bynum;  Surgeon: Yanelis Addison MD;  [facility];  Service: Gynecology   • MO LAPAROSCOPY W TOTAL HYSTERECTOMY UTERUS 250 GM/< N/A 6/20/2017    Procedure: HYSTERECTOMY LAPAROSCOPIC TOTAL, BILATERAL SALPINGECTOMY, CYSTO;  Surgeon: Yanelis Addison MD;  Location[facility];  Service: Gynecology   • TUBAL LIGATION     • WISDOM TOOTH EXTRACTION         Family History   Problem Relation Age of Onset   • No Known Problems Mother    • Thyroid disease unspecified Father    • No Known Problems Daughter    • No Known Problems Daughter    • No Known Problems Daughter    • No Known Problems Maternal Grandmother    • No Known Problems Maternal Grandfather    • Lung cancer Paternal Grandmother         age unknown   • No Known Problems Paternal Grandfather    • No Known Problems Maternal Aunt    • No Known Problems Maternal Aunt    • No Known Problems Maternal Aunt    • No Known Problems Maternal Aunt    • No Known Problems Maternal Aunt    • No Known Problems Paternal Aunt      I have reviewed and agree with the history as documented.     E-Cigarette/Vaping   • E-Cigarette Use Never User      E-Cigarette/Vaping Substances   • Nicotine No    • THC No    • CBD No    • Flavoring No    • Other No    • Unknown No      Social History     Tobacco Use   • Smoking status: Never   • Smokeless tobacco: Never   Vaping Use   • Vaping Use: Never used   Substance Use Topics   • Alcohol use: Yes     Alcohol/week: 3.0 standard drinks of alcohol     Types: 3 Glasses of wine per week   • Drug use: No       Review of Systems   Constitutional: Negative for chills, fever and weight loss. Eyes: Negative for photophobia, pain and visual disturbance. Cardiovascular: Negative for chest pain and syncope. Gastrointestinal: Negative for bowel incontinence, nausea and vomiting. Genitourinary: Negative for bladder incontinence and difficulty urinating. Musculoskeletal: Positive for neck pain. Negative for back pain, joint swelling and neck stiffness. Skin: Negative for color change, rash and wound. Neurological: Negative for dizziness, tingling, syncope, weakness, numbness and headaches. Psychiatric/Behavioral: Negative for confusion. All other systems reviewed and are negative. Physical Exam  Physical Exam  Vitals and nursing note reviewed. Constitutional:       General: She is not in acute distress. Appearance: Normal appearance. She is not ill-appearing. HENT:      Head: Normocephalic and atraumatic. Eyes:      General: Vision grossly intact. Extraocular Movements: Extraocular movements intact. Right eye: Normal extraocular motion and no nystagmus. Left eye: Normal extraocular motion and no nystagmus. Conjunctiva/sclera: Conjunctivae normal.      Pupils: Pupils are equal, round, and reactive to light. Comments: No photophobia    Neck:      Meningeal: Brudzinski's sign and Kernig's sign absent. Cardiovascular:      Rate and Rhythm: Normal rate and regular rhythm. Pulses:           Radial pulses are 2+ on the right side and 2+ on the left side. Dorsalis pedis pulses are 2+ on the right side and 2+ on the left side. Pulmonary:      Effort: Pulmonary effort is normal. No respiratory distress. Breath sounds: Normal breath sounds. Musculoskeletal:         General: No swelling. Right shoulder: No swelling or bony tenderness. Normal range of motion. Normal strength. Cervical back: Neck supple. Spasms and tenderness present. No swelling, erythema, signs of trauma, rigidity, bony tenderness or crepitus. Muscular tenderness present. No spinous process tenderness. Decreased range of motion (>45 degrees, decreased 2/2 pain. full flexion and extension. ). Thoracic back: Normal.        Back:    Skin:     General: Skin is warm and dry. Capillary Refill: Capillary refill takes less than 2 seconds. Findings: No bruising, erythema or rash. Neurological:      General: No focal deficit present. Mental Status: She is alert and oriented to person, place, and time. Gait: Gait normal.      Comments: GCS 15. AAOx4. No focal neuro deficits. CN II-XII intact. PERRL. EOMI. No pronator drift.  strength 5/5 bilaterally. B/L UE strength 5/5 throughout. Finger to nose, heel shin, rapid alternating movements Cerebellar function normal. Ambulates without difficulty. B/L LE strength 5/5 throughout.  Gross sensation to b/l upper and lower extremities intact.       Psychiatric:         Mood and Affect: Mood normal.         Behavior: Behavior normal.         Vital Signs  ED Triage Vitals   Temperature Pulse Respirations Blood Pressure SpO2   07/11/23 1735 07/11/23 1735 07/11/23 1735 07/11/23 1735 07/11/23 1735   98.5 °F (36.9 °C) 89 16 146/89 98 %      Temp Source Heart Rate Source Patient Position - Orthostatic VS BP Location FiO2 (%)   07/11/23 1735 07/11/23 1735 07/11/23 1735 07/11/23 1735 --   Oral Monitor Sitting Left arm       Pain Score       07/11/23 1813       8           Vitals:    07/11/23 1735   BP: 146/89   Pulse: 89   Patient Position - Orthostatic VS: Sitting         Visual Acuity      ED Medications  Medications   lidocaine (LIDODERM) 5 % patch 1 patch (1 patch Topical Medication Applied 7/11/23 1815)   ketorolac (TORADOL) injection 15 mg (15 mg Intramuscular Given 7/11/23 1813)       Diagnostic Studies  Results Reviewed     None                 No orders to display              Procedures  Procedures         ED Course  ED Course as of 07/11/23 1843   Tue Jul 11, 2023   1804 Patient drove here. Reports hysterectomy. SBIRT 22yo+    Flowsheet Row Most Recent Value   Initial Alcohol Screen: US AUDIT-C     1. How often do you have a drink containing alcohol? 3 Filed at: 07/11/2023 1747   2. How many drinks containing alcohol do you have on a typical day you are drinking? 2 Filed at: 07/11/2023 1747   3a. Male UNDER 65: How often do you have five or more drinks on one occasion? 0 Filed at: 07/11/2023 1747   3b. FEMALE Any Age, or MALE 65+: How often do you have 4 or more drinks on one occassion? 0 Filed at: 07/11/2023 1747   Audit-C Score 5 Filed at: 07/11/2023 1747   THAD: How many times in the past year have you. .. Used an illegal drug or used a prescription medication for non-medical reasons? Never Filed at: 07/11/2023 1747                    Medical Decision Making  Atraumatic R sided neck pain from occiput to trapezius. Trapezius spasm noted. No focal neuro deficits on exam. No rigidity. Decreased R rotation 2/2 pain. Pain is reproducible with palpation, movement. Low clinical concern for neurovascular etiology. Suspect MSK etiology. No trauma, immunosuppression, osteoporosis, or cancer. Low clinical concern for fracture, additional imaging not indicated at this time. Plan for supportive care with muscle relaxer, nsaids, outpatient follow up. All imaging and/or lab testing discussed with patient, strict return to ED precautions discussed. Patient recommended to follow up promptly with appropriate outpatient provider.  Patient and/or family members verbalizes understanding and agrees with plan. Patient and/or family members were given opportunity to ask questions, all questions were answered at this time. Patient is stable for discharge.     Portions of the record may have been created with voice recognition software. Occasional wrong word or "sound a like" substitutions may have occurred due to the inherent limitations of voice recognition software. Read the chart carefully and recognize, using context, where substitutions have occurred. Risk  Prescription drug management. Disposition  Final diagnoses:   Neck pain on right side   Trapezius muscle spasm     Time reflects when diagnosis was documented in both MDM as applicable and the Disposition within this note     Time User Action Codes Description Comment    7/11/2023  6:17 PM Claudell Rua Add [M54.2] Neck pain on right side     7/11/2023  6:17 PM Claudell Rua Add [X74.956] Trapezius muscle spasm       ED Disposition     ED Disposition   Discharge    Condition   Stable    Date/Time   Tue Jul 11, 2023  6:18 PM    [address] discharge to home/self care.                Follow-up Information     Follow up With Specialties Details Why Contact Jake Light MD Family Medicine Schedule an appointment as soon as possible for a visit in 1 week For follow up regarding your symptoms [address] [place] [address]            Discharge Medication List as of 7/11/2023  6:18 PM      START taking these medications    Details   lidocaine (LIDODERM) 5 % Apply 1 patch topically over 12 hours every 24 hours for 15 days Remove & Discard patch within 12 hours or as directed by MD, Starting Tue 7/11/2023, Until Wed 7/26/2023, Normal      methocarbamol (ROBAXIN) 500 mg tablet Take 1 tablet (500 mg total) by mouth 2 (two) times a day, Starting Tue 7/11/2023, Normal      naproxen (EC NAPROSYN) 500 MG EC tablet Take 1 tablet (500 mg total) by mouth 2 (two) times a day with meals, Starting Tue 7/11/2023, Until Wed 7/10/2024, Normal         CONTINUE these medications which have NOT CHANGED    Details   Blood Glucose Calibration (OneTouch Verio) SOLN Use as needed to calibrate test strips, Normal      Blood Glucose Monitoring Suppl (OneTouch Verio) w/Device KIT Use 2 (two) times a day, Starting Mon 3/29/2021, Normal      dulaglutide (Trulicity) 1.5 UU/3.8IV injection Inject 0.5 mL (1.5 mg total) under the skin every 7 days, Starting Wed 6/14/2023, Normal      Lancets (onetouch ultrasoft) lancets Use 2 (two) times a day, Starting Mon 3/29/2021, Normal      OneTouch Verio test strip USE 1 EACH 2 (TWO) TIMES A DAY USE AS INSTRUCTED, Starting Mon 11/14/2022, Normal             No discharge procedures on file.     PDMP Review     None          ED Provider  Electronically Signed by           Pedro Luis Seo PA-C  07/11/23 0175

## 2023-07-11 NOTE — Clinical Note
Som Crystal was seen and treated in our emergency department on 7/11/2023. Diagnosis:     Sylvette  . She may return on this date: 07/14/2023         If you have any questions or concerns, please don't hesitate to call.       Demond Medrano PA-C    ______________________________           _______________          _______________  Hospital Representative                              Date                                Time

## 2023-07-11 NOTE — DISCHARGE INSTRUCTIONS
Do not drive or operate heavy machinery while taking Robaxin. Follow-up with primary care provider. Return to ED for new or worsening symptoms as discussed.

## 2024-01-08 DIAGNOSIS — E11.9 TYPE 2 DIABETES MELLITUS WITHOUT COMPLICATION, WITH LONG-TERM CURRENT USE OF INSULIN (HCC): ICD-10-CM

## 2024-01-08 DIAGNOSIS — Z79.4 TYPE 2 DIABETES MELLITUS WITHOUT COMPLICATION, WITH LONG-TERM CURRENT USE OF INSULIN (HCC): ICD-10-CM

## 2024-01-08 RX ORDER — DULAGLUTIDE 1.5 MG/.5ML
1.5 INJECTION, SOLUTION SUBCUTANEOUS
Qty: 2 ML | Refills: 0 | Status: SHIPPED | OUTPATIENT
Start: 2024-01-08

## 2024-01-22 DIAGNOSIS — Z79.4 TYPE 2 DIABETES MELLITUS WITHOUT COMPLICATION, WITH LONG-TERM CURRENT USE OF INSULIN (HCC): Primary | ICD-10-CM

## 2024-01-22 DIAGNOSIS — E11.9 TYPE 2 DIABETES MELLITUS WITHOUT COMPLICATION, WITH LONG-TERM CURRENT USE OF INSULIN (HCC): Primary | ICD-10-CM

## 2024-02-01 ENCOUNTER — TELEPHONE (OUTPATIENT)
Dept: ENDOCRINOLOGY | Facility: CLINIC | Age: 49
End: 2024-02-01

## 2024-02-01 NOTE — TELEPHONE ENCOUNTER
Jamel Aquino (Key: [id number])  PA Case ID #: [id number]  Rx #: [id number]  Need Help? Call us at [phone]  Outcome  Approved today  Meets Pharmacy Policy  Drug  Mounjaro 5MG/0.5ML pen-injectors    Form  [employer] PA Form

## 2024-02-01 NOTE — TELEPHONE ENCOUNTER
Jamel Aquino (Key: [id number])  PA Case ID #: [id number]  Rx #: [id number]  Need Help? Call us at [phone]  Status  Sent to Plan today  Drug  Mounjaro 5MG/0.5ML pen-injectors    Form  [employer] PA Form

## 2024-03-19 NOTE — TELEPHONE ENCOUNTER
Left message for call back PRIOR AUTHORIZATION DENIED    Medication: DEXMETHYLPHENIDATE HCL ER PO  Insurance Company: [employer] ([facility]) - Phone [phone] Fax [phone]  Denial Date: 3/19/2024  Denial Reason(s):         Appeal Information:         Thank you,    Gayla Taylor  Oncology Pharmacy Liaison II  [email or web address]  Phone: [phone]  Fax: [phone]

## 2024-06-10 DIAGNOSIS — E11.9 TYPE 2 DIABETES MELLITUS WITHOUT COMPLICATION, WITH LONG-TERM CURRENT USE OF INSULIN (HCC): ICD-10-CM

## 2024-06-10 DIAGNOSIS — Z79.4 TYPE 2 DIABETES MELLITUS WITHOUT COMPLICATION, WITH LONG-TERM CURRENT USE OF INSULIN (HCC): ICD-10-CM

## 2024-08-07 DIAGNOSIS — E11.9 TYPE 2 DIABETES MELLITUS WITHOUT COMPLICATION, WITH LONG-TERM CURRENT USE OF INSULIN (HCC): ICD-10-CM

## 2024-08-07 DIAGNOSIS — Z79.4 TYPE 2 DIABETES MELLITUS WITHOUT COMPLICATION, WITH LONG-TERM CURRENT USE OF INSULIN (HCC): ICD-10-CM

## 2024-08-07 RX ORDER — TIRZEPATIDE 5 MG/.5ML
INJECTION, SOLUTION SUBCUTANEOUS
Qty: 2 ML | Refills: 1 | OUTPATIENT
Start: 2024-08-07

## 2024-08-07 NOTE — TELEPHONE ENCOUNTER
Reason for call: NOT A DUPLICATE was told no refills at pharmacy   [x] Refill   [] Prior Auth  [] Other:     Office:   [] PCP/Provider -   [x] Specialty/Provider - ENDO/    Medication: tirzepatide (Mounjaro) 5 MG/0.5ML     Dose/Frequency: Inject 0.5 mL (5 mg total) under the skin every 7 days     Quantity: 2 ML    [facility] [phone]     Does the patient have enough for 3 days?   [x] Yes   [] No - Send as HP to POD

## 2024-08-07 NOTE — TELEPHONE ENCOUNTER
Patient calling to check the status of the refill for her  tirzepatide (Mounjaro) 5 MG/0.5ML  advised her it is currently pending and when it is approved she will received a message via [place]

## 2024-08-08 NOTE — TELEPHONE ENCOUNTER
Requested medication(s) are due for refill today: Yes  Patient has already received a courtesy refill: No  Other reason request has been forwarded to provider: Patient has appointment with you in November.

## 2024-08-08 NOTE — TELEPHONE ENCOUNTER
Please see thread, I am not sure exactly what to schedule patient for.  After going through the chart, I do not see any notes or referrals that would suggest she should be scheduled with education. Please advise.

## 2024-08-08 NOTE — TELEPHONE ENCOUNTER
Reason for call:   [] Refill   [] Prior Auth  [x] Other: Pt called back, states someone called her? Gave pt message that she needs to schedule appt. Pt will call the office to schedule office visit.

## 2024-08-09 ENCOUNTER — TELEPHONE (OUTPATIENT)
Age: 49
End: 2024-08-09

## 2024-08-09 NOTE — TELEPHONE ENCOUNTER
PA for tirzepatide (Mounjaro) 5 MG/0.5ML   SUBMITTED     via    [x]CMM-KEY [id number]  []Surescripts-Case ID #    []Faxed to plan   []Other website    []Phone call Case ID #      Office notes sent, clinical questions answered. Awaiting determination    Turnaround time for your insurance to make a decision on your Prior Authorization can take 7-21 business days.

## 2024-08-13 NOTE — TELEPHONE ENCOUNTER
Patient calling in regards to PA looking for update.     Upon looking into patient's chart there is already an prior authorization approval scanned into the patient's chart under media on 2/1/2024 that states patient's mounjaro is approved until 1/31/2025.     Patient is asking what is going on and is asking for an update.

## 2024-08-15 NOTE — TELEPHONE ENCOUNTER
Reached out to patient to confirm that insurance on file is active. Please review for prior auth. Patient hasn't had medication in two weeks   Thanks

## 2024-08-16 NOTE — TELEPHONE ENCOUNTER
PA for tirzepatide (Mounjaro) 5 MG/0.5ML SUBMITTED     via    [x]CMM-KEY: [id number]  []Surescripts-Case ID #    []Faxed to plan   []Other website    []Phone call Case ID #      Office notes sent, clinical questions answered. Awaiting determination    Turnaround time for your insurance to make a decision on your Prior Authorization can take 7-21 business days.

## 2024-08-20 ENCOUNTER — TELEPHONE (OUTPATIENT)
Dept: ENDOCRINOLOGY | Facility: CLINIC | Age: 49
End: 2024-08-20

## 2024-11-07 ENCOUNTER — OFFICE VISIT (OUTPATIENT)
Dept: ENDOCRINOLOGY | Facility: CLINIC | Age: 49
End: 2024-11-07
Payer: COMMERCIAL

## 2024-11-07 VITALS
HEART RATE: 81 BPM | HEIGHT: 63 IN | DIASTOLIC BLOOD PRESSURE: 86 MMHG | OXYGEN SATURATION: 99 % | SYSTOLIC BLOOD PRESSURE: 128 MMHG | WEIGHT: 155.8 LBS | BODY MASS INDEX: 27.61 KG/M2

## 2024-11-07 DIAGNOSIS — E11.00 TYPE 2 DIABETES MELLITUS WITH HYPEROSMOLARITY WITHOUT COMA, WITHOUT LONG-TERM CURRENT USE OF INSULIN (HCC): ICD-10-CM

## 2024-11-07 DIAGNOSIS — E11.9 TYPE 2 DIABETES MELLITUS WITHOUT COMPLICATION, WITH LONG-TERM CURRENT USE OF INSULIN (HCC): Primary | ICD-10-CM

## 2024-11-07 DIAGNOSIS — Z79.4 TYPE 2 DIABETES MELLITUS WITHOUT COMPLICATION, WITH LONG-TERM CURRENT USE OF INSULIN (HCC): Primary | ICD-10-CM

## 2024-11-07 DIAGNOSIS — E13.9 DIABETES 1.5, MANAGED AS TYPE 1 (HCC): ICD-10-CM

## 2024-11-07 LAB — SL AMB POCT HEMOGLOBIN AIC: 5.5 (ref ?–6.5)

## 2024-11-07 PROCEDURE — 83036 HEMOGLOBIN GLYCOSYLATED A1C: CPT | Performed by: INTERNAL MEDICINE

## 2024-11-07 PROCEDURE — 99213 OFFICE O/P EST LOW 20 MIN: CPT | Performed by: INTERNAL MEDICINE

## 2024-11-07 RX ORDER — BLOOD SUGAR DIAGNOSTIC
1 STRIP MISCELLANEOUS 2 TIMES DAILY
Qty: 200 STRIP | Refills: 3 | Status: SHIPPED | OUTPATIENT
Start: 2024-11-07

## 2024-11-07 NOTE — PROGRESS NOTES
"    Est Patient Progress Note      Chief Complaint   Patient presents with    Diabetes Type 2      Referring Provider  No referring provider defined for this encounter.     History of Present Illness:   Jamel Aquino is a 49 y.o. female with a history of T2 diabetes with long term use of insulin seen in follow-up. Last seen in the office in June 2023    She recalls being diagnosed with diabetes as gestational DM with her last pregnancy 16yrs ago, which then improved. It returned one year later.  She was initially treated with metformin, but she had diarrhea and headaches. This was replaced with lantus. Glipizide is in the chart, but she denies taking this or any other diabetes treatment. There were times when she went a week without insulin. PATTIE abs were checked and were undetectable    She denies complications, but is concerned about her vision. She denies neuropathy or renal disease, no MI ro CVA. Denies hospitalizations for severe hypoglycemic or severe hyperglycemic episodes. Her med list includes gabapentin, which she states is for hot flashes.    Current regimen: Mounjaro 5mg  However she notes more headaches on Moujaro and not on Trulicity     Recalled Home blood glucose readings:  Highest is 157  Fastings 85-90s    Hypoglycemic episodes: none  Hypoglycemia symptoms: \"shaking,\" anxious  Treatment of hypoglycemia: \"tries to eat something\" or drink juice      Diabetes education: saw RD  Diet: no changes  Activity: trying to work out at home    Ophthalmology: \"a couple of months ago\"  Podiatry: does not see; self care  Dental: continues to see  COVID: primary series and booster. Had COVID a total of 3 times      Thyroid disorders: none  History of pancreatitis: no past history; alcohol use is infrequent (once every few weeks),   Cannot recall if she has had high trigs; s/p cholecystectomy    Patient Active Problem List   Diagnosis    S/P laparoscopic hysterectomy    Type II diabetes mellitus (HCC)    Elevated " BP without diagnosis of hypertension      Past Medical History:   Diagnosis Date    Adenomyosis     Anxiety     Depression     Diabetes mellitus (HCC)     NIDDM     Headache     Hypertension     Irregular bleeding     Menorrhagia     Pelvic pain       Past Surgical History:   Procedure Laterality Date    CHOLECYSTECTOMY      ELBOW SURGERY      HYSTERECTOMY      TN HYSTEROSCOPY BX ENDOMETRIUM&/POLYPC W/WO D&C N/A 12/20/2016    Procedure: DILATATION AND CURETTAGE (D&C) WITH HYSTEROSCOPY;  Surgeon: Stephanie Cherry MD;  [facility];  Service: Gynecology    TN HYSTEROSCOPY BX ENDOMETRIUM&/POLYPC W/WO D&C N/A 8/16/2016    Procedure: DILATATION AND CURETTAGE (D&C) WITH HYSTEROSCOPY POLYPECTOMY ;  Surgeon: Stephanie Cherry MD;  [facility];  Service: Gynecology    TN HYSTEROSCOPY ENDOMETRIAL ABLATION N/A 12/20/2016    Procedure: ABLATION ENDOMETRIAL NOVASURE;  Surgeon: Stephanie Cherry MD;  [facility];  Service: Gynecology    TN LAPAROSCOPY W TOTAL HYSTERECTOMY UTERUS 250 GM/< N/A 6/20/2017    Procedure: HYSTERECTOMY LAPAROSCOPIC TOTAL, BILATERAL SALPINGECTOMY, CYSTO;  Surgeon: Stephanie Cherry MD;  Location[facility];  Service: Gynecology    TUBAL LIGATION      WISDOM TOOTH EXTRACTION        Family History   Problem Relation Age of Onset    No Known Problems Mother     Thyroid disease unspecified Father     No Known Problems Daughter     No Known Problems Daughter     No Known Problems Daughter     No Known Problems Maternal Grandmother     No Known Problems Maternal Grandfather     Lung cancer Paternal Grandmother         age unknown    No Known Problems Paternal Grandfather     No Known Problems Maternal Aunt     No Known Problems Maternal Aunt     No Known Problems Maternal Aunt     No Known Problems Maternal Aunt     No Known Problems Maternal Aunt     No Known Problems Paternal Aunt      Social History     Tobacco Use    Smoking status: Never    Smokeless tobacco: Never   Substance Use Topics     "Alcohol use: Yes     Alcohol/week: 3.0 standard drinks of alcohol     Types: 3 Glasses of wine per week     Allergies   Allergen Reactions    Metformin Diarrhea and Headache         Current Outpatient Medications:     Blood Glucose Calibration (OneTouch Verio) SOLN, Use as needed to calibrate test strips, Disp: 1 each, Rfl: 1    Blood Glucose Monitoring Suppl (OneTouch Verio) w/Device KIT, Use 2 (two) times a day, Disp: 1 kit, Rfl: 0    Dulaglutide 1.5 MG/0.5ML SOAJ, Inject 1.5mg subcut once per week, Disp: 2 mL, Rfl: 5    glucose blood (OneTouch Verio) test strip, Use 1 each 2 (two) times a day Use as instructed, Disp: 200 strip, Rfl: 3    Lancets (onetouch ultrasoft) lancets, Use 2 (two) times a day, Disp: 200 each, Rfl: 1    lidocaine (LIDODERM) 5 %, Apply 1 patch topically over 12 hours every 24 hours for 15 days Remove & Discard patch within 12 hours or as directed by MD (Patient not taking: Reported on 11/7/2024), Disp: 15 patch, Rfl: 0  Review of Systems   Constitutional:  Positive for unexpected weight change.   HENT:  Negative for hearing loss, trouble swallowing and voice change.    Eyes:  Negative for visual disturbance.   Gastrointestinal:  Positive for constipation. Negative for diarrhea and nausea.   Endocrine: Negative for polydipsia and polyuria.   Musculoskeletal:  Negative for gait problem.   Neurological:  Negative for tremors and numbness.   Psychiatric/Behavioral:  The patient is not nervous/anxious.         Physical Exam:  Body mass index is 27.6 kg/m².  /86   Pulse 81   Ht 5' 3\" (1.6 m)   Wt 70.7 kg (155 lb 12.8 oz)   LMP 12/12/2016 (Approximate) Comment: ablation dec 2016  SpO2 99%   BMI 27.60 kg/m²    Wt Readings from Last 3 Encounters:   11/07/24 70.7 kg (155 lb 12.8 oz)   07/11/23 69.8 kg (153 lb 14.1 oz)   06/27/23 68.5 kg (151 lb 0.2 oz)       GEN: NAD    Physical Exam   Gen: appears well-developed and well-nourished. No apparent distress.   Head: Normocephalic and " atraumatic.   Eyes: no stare or proptosis, no periorbital edema  E/N/M: mask in place, hearing grossly intact  Neck: range of motion nl.   Pulmonary/Chest: breathing  comfortably, no accessory muscle use, effort normal.   Musculoskeletal: moves all extremities, gait nl  Neurological: alert and oriented to person, place, and time. No upper ext tremor appreciated  Skin: does not appear diaphoretic  Psychiatric: normal mood and affect; behavior is normal; no gross lapses in memory, answer questions appropriately          Labs:     Lab Results   Component Value Date    HGBA1C 5.5 11/07/2024          Lab Results   Component Value Date    CREATININE 0.92 05/11/2024    CREATININE 0.83 06/23/2023    CREATININE 0.84 02/11/2022    BUN 15 05/11/2024    K 4.2 05/11/2024     05/11/2024    CO2 24 05/11/2024       Lab Results   Component Value Date    HDL 55 02/06/2021    TRIG 116 02/06/2021       Lab Results   Component Value Date    ALT 9 05/11/2024    AST 11 05/11/2024    ALKPHOS 51 05/11/2024           Impression/Plan:  Problem List Items Addressed This Visit       Type II diabetes mellitus (HCC) - Primary       Lab Results   Component Value Date    HGBA1C 5.5 11/07/2024       Her A1cis still controlled, but there are more side effects with Mounjaro. Will change back to Trulicity 1.5mg weekly. RX sent. Labs ordered for next visit including A1c and lipids         Relevant Medications    Dulaglutide 1.5 MG/0.5ML SOAJ    glucose blood (OneTouch Verio) test strip    Other Relevant Orders    POCT hemoglobin A1c (Completed)    Comprehensive metabolic panel    Lipid panel    Hemoglobin A1C     Other Visit Diagnoses       Diabetes 1.5, managed as type 1 (HCC)        Relevant Medications    Dulaglutide 1.5 MG/0.5ML SOAJ                     Plan RTC in 5-6mos  Discussed with the patient and all questioned fully answered. She will call me if any problems arise.    Counseled patient on diagnostic results, prognosis, risk and benefit  of treatment options, instruction for management, importance of treatment compliance, Risk  factor reduction and impressions      Mini John MD

## 2024-11-07 NOTE — ASSESSMENT & PLAN NOTE
Lab Results   Component Value Date    HGBA1C 5.5 11/07/2024       Her A1cis still controlled, but there are more side effects with Mounjaro. Will change back to Trulicity 1.5mg weekly. RX sent. Labs ordered for next visit including A1c and lipids

## 2024-11-15 ENCOUNTER — TELEPHONE (OUTPATIENT)
Dept: ENDOCRINOLOGY | Facility: CLINIC | Age: 49
End: 2024-11-15

## 2024-11-15 NOTE — TELEPHONE ENCOUNTER
Reason for call:   [x] Prior Auth  [] Other:     Caller:  [x] Patient  [] Pharmacy  Name:   Address:   Callback Number:     Medication:   Dulaglutide 1.5 mg/0.5ML SOAJ- Inject 1.5 mg subcut once per week      Ordering Provider:   [] PCP/Provider -   [x] Speciality/Provider - Endo

## 2024-11-15 NOTE — TELEPHONE ENCOUNTER
PA for Trulicity 1.5mg SUBMITTED to [employer]    via    []CMM-KEY:   []Surescripts-Case ID #   [x]Availity-Auth ID # [id number] NDC# [id number]  []Faxed to plan   []Other website   []Phone call Case ID #     [x]PA sent as URGENT    All office notes, labs and other pertaining documents and studies sent. Clinical questions answered. Awaiting determination from insurance company.     Turnaround time for your insurance to make a decision on your Prior Authorization can take 7-21 business days.

## 2024-11-18 ENCOUNTER — PATIENT MESSAGE (OUTPATIENT)
Dept: ENDOCRINOLOGY | Facility: CLINIC | Age: 49
End: 2024-11-18

## 2025-02-15 ENCOUNTER — APPOINTMENT (OUTPATIENT)
Dept: LAB | Facility: HOSPITAL | Age: 50
End: 2025-02-15
Payer: COMMERCIAL

## 2025-02-15 DIAGNOSIS — E11.9 TYPE 2 DIABETES MELLITUS WITHOUT COMPLICATION, WITH LONG-TERM CURRENT USE OF INSULIN (HCC): ICD-10-CM

## 2025-02-15 DIAGNOSIS — Z79.4 TYPE 2 DIABETES MELLITUS WITHOUT COMPLICATION, WITH LONG-TERM CURRENT USE OF INSULIN (HCC): ICD-10-CM

## 2025-02-15 LAB
ALBUMIN SERPL BCG-MCNC: 3.9 G/DL (ref 3.5–5)
ALP SERPL-CCNC: 65 U/L (ref 34–104)
ALT SERPL W P-5'-P-CCNC: 18 U/L (ref 7–52)
ANION GAP SERPL CALCULATED.3IONS-SCNC: 8 MMOL/L (ref 4–13)
AST SERPL W P-5'-P-CCNC: 14 U/L (ref 13–39)
BILIRUB SERPL-MCNC: 0.36 MG/DL (ref 0.2–1)
BUN SERPL-MCNC: 16 MG/DL (ref 5–25)
CALCIUM SERPL-MCNC: 8.7 MG/DL (ref 8.4–10.2)
CHLORIDE SERPL-SCNC: 104 MMOL/L (ref 96–108)
CHOLEST SERPL-MCNC: 208 MG/DL (ref ?–200)
CO2 SERPL-SCNC: 27 MMOL/L (ref 21–32)
CREAT SERPL-MCNC: 0.85 MG/DL (ref 0.6–1.3)
EST. AVERAGE GLUCOSE BLD GHB EST-MCNC: 123 MG/DL
GFR SERPL CREATININE-BSD FRML MDRD: 80 ML/MIN/1.73SQ M
GLUCOSE P FAST SERPL-MCNC: 114 MG/DL (ref 65–99)
HBA1C MFR BLD: 5.9 %
HDLC SERPL-MCNC: 60 MG/DL
LDLC SERPL CALC-MCNC: 120 MG/DL (ref 0–100)
NONHDLC SERPL-MCNC: 148 MG/DL
POTASSIUM SERPL-SCNC: 4 MMOL/L (ref 3.5–5.3)
PROT SERPL-MCNC: 6.8 G/DL (ref 6.4–8.4)
SODIUM SERPL-SCNC: 139 MMOL/L (ref 135–147)
TRIGL SERPL-MCNC: 139 MG/DL (ref ?–150)

## 2025-02-15 PROCEDURE — 83036 HEMOGLOBIN GLYCOSYLATED A1C: CPT

## 2025-02-15 PROCEDURE — 80053 COMPREHEN METABOLIC PANEL: CPT

## 2025-02-15 PROCEDURE — 80061 LIPID PANEL: CPT

## 2025-02-15 PROCEDURE — 36415 COLL VENOUS BLD VENIPUNCTURE: CPT

## 2025-02-17 ENCOUNTER — RESULTS FOLLOW-UP (OUTPATIENT)
Dept: ENDOCRINOLOGY | Facility: CLINIC | Age: 50
End: 2025-02-17

## 2025-03-03 ENCOUNTER — TELEPHONE (OUTPATIENT)
Dept: ENDOCRINOLOGY | Facility: CLINIC | Age: 50
End: 2025-03-03

## 2025-03-05 DIAGNOSIS — E11.00 TYPE 2 DIABETES MELLITUS WITH HYPEROSMOLARITY WITHOUT COMA, WITHOUT LONG-TERM CURRENT USE OF INSULIN (HCC): Primary | ICD-10-CM

## 2025-03-28 DIAGNOSIS — E11.00 TYPE 2 DIABETES MELLITUS WITH HYPEROSMOLARITY WITHOUT COMA, WITHOUT LONG-TERM CURRENT USE OF INSULIN (HCC): ICD-10-CM

## 2025-03-28 RX ORDER — DULAGLUTIDE 3 MG/.5ML
INJECTION, SOLUTION SUBCUTANEOUS
Qty: 2 ML | Refills: 0 | Status: SHIPPED | OUTPATIENT
Start: 2025-03-28

## 2025-04-21 DIAGNOSIS — E11.00 TYPE 2 DIABETES MELLITUS WITH HYPEROSMOLARITY WITHOUT COMA, WITHOUT LONG-TERM CURRENT USE OF INSULIN (HCC): ICD-10-CM

## 2025-04-21 RX ORDER — DULAGLUTIDE 3 MG/.5ML
INJECTION, SOLUTION SUBCUTANEOUS
Qty: 2 ML | Refills: 4 | Status: SHIPPED | OUTPATIENT
Start: 2025-04-21 | End: 2025-04-28

## 2025-04-27 DIAGNOSIS — E11.9 TYPE 2 DIABETES MELLITUS WITHOUT COMPLICATION, WITH LONG-TERM CURRENT USE OF INSULIN (HCC): ICD-10-CM

## 2025-04-27 DIAGNOSIS — Z79.4 TYPE 2 DIABETES MELLITUS WITHOUT COMPLICATION, WITH LONG-TERM CURRENT USE OF INSULIN (HCC): ICD-10-CM

## 2025-04-28 ENCOUNTER — OFFICE VISIT (OUTPATIENT)
Dept: ENDOCRINOLOGY | Facility: CLINIC | Age: 50
End: 2025-04-28
Payer: COMMERCIAL

## 2025-04-28 VITALS
DIASTOLIC BLOOD PRESSURE: 80 MMHG | BODY MASS INDEX: 29.77 KG/M2 | OXYGEN SATURATION: 98 % | SYSTOLIC BLOOD PRESSURE: 118 MMHG | HEART RATE: 79 BPM | HEIGHT: 63 IN | WEIGHT: 168 LBS

## 2025-04-28 DIAGNOSIS — E11.00 TYPE 2 DIABETES MELLITUS WITH HYPEROSMOLARITY WITHOUT COMA, WITHOUT LONG-TERM CURRENT USE OF INSULIN (HCC): Primary | ICD-10-CM

## 2025-04-28 PROCEDURE — 99214 OFFICE O/P EST MOD 30 MIN: CPT | Performed by: PHYSICIAN ASSISTANT

## 2025-04-28 RX ORDER — TIRZEPATIDE 5 MG/.5ML
5 INJECTION, SOLUTION SUBCUTANEOUS WEEKLY
Qty: 2 ML | Refills: 3 | Status: SHIPPED | OUTPATIENT
Start: 2025-05-19

## 2025-04-28 NOTE — PROGRESS NOTES
Name: Jamel Aquino      : 1975      MRN: [id number]  Encounter Provider: Arleth Knight PA-C  Encounter Date: 2025   Encounter department: [facility] [place]    Chief Complaint   Patient presents with   • Diabetes Type 2   :  Assessment & Plan  Type 2 diabetes mellitus with hyperosmolarity without coma, without long-term current use of insulin (HCC)    Lab Results   Component Value Date    HGBA1C 5.9 (H) 02/15/2025   Current HbA1c represents acceptable control. Blood sugars unavailable for review though patient reports numbers have been elevated prior to bed.  Continue current regimen with the following adjustments:  Transition Trulicity back to Mounjaro with plan to titrate as tolerated.  Advised to adhere to diabetic diet, and recommended staying active/exercising routinely.  Discussed symptoms and treatment of hypoglycemia.    Recommended routine follow-up with Ophthalmology and foot exams.   Ordered blood work to complete prior to next visit.    Orders:  •  Tirzepatide (Mounjaro) 5 MG/0.5ML SOAJ; Inject 5 mg under the skin once a week Do not start before May 19, 2025.  •  Hemoglobin A1C; Future  •  Comprehensive metabolic panel; Future  •  Albumin / creatinine urine ratio; Future  •  Lipid panel; Future      Assessment & Plan        History of Present Illness {?Quick Links Encounters * My Last Note * Last Note in Specialty * Snapshot * Since Last Visit * History :38176}  History of Present Illness  Jamel Aquino is a 50 y.o. female with type 2 diabetes seen in follow up.     She reports a 25-pound weight gain since switching from Mounjaro to Trulicity.  She initially requested the trial of alternative GLP medication due to the belief that Mounjaro was causing her headaches.  However since discontinuation of Mounjaro the headaches have persisted. She desires to resume Mounjaro and has not reported any adverse effects from it. Her current regimen includes  "Trulicity 3 mg, with fasting glucose levels greater than 105 and pre-bedtime levels between 169-170. There was one instance of a glucose level of 300 due to an error. She has had no recent hospitalizations, illnesses, or steroid use.    She undergoes regular eye and foot exams by her primary care physician but does not have a dedicated podiatrist. She is scheduled to see a neurologist next month for her headaches.        Current regimen:   Trulicity 3mg weekly      Last Eye Exam: Not on file  Last Foot Exam: 06/14/2023  Health Maintenance   Topic Date Due   • Diabetic Eye Exam  07/09/2022   • Diabetic Foot Exam  04/28/2026     Pertinent Medical History   Patient first diagnosed with gestational diabetes during her pregnancy in 2008.  Her blood sugar stabilized but worsened again 1 year following this.  She was initially managed with metformin but was unable to tolerate this due to diarrhea and headaches.  She was then placed on Lantus.  She denies ever being on glipizide or other oral agents. PATTIE abs undetectable.    Review of Systems as per HPI         Medical History Reviewed by provider this encounter:     .    Objective {?Quick Links Trend Vitals * Enter New Vitals * Results Review * Timeline (Adult) * Labs * Imaging * Cardiology * Procedures * Lung Cancer Screening * Surgical eConsent :01534}  /80   Pulse 79   Ht 5' 3\" (1.6 m)   Wt 76.2 kg (168 lb)   LMP 12/12/2016 (Approximate) Comment: ablation dec 2016  SpO2 98%   BMI 29.76 kg/m²      Body mass index is 29.76 kg/m².  Wt Readings from Last 3 Encounters:   04/28/25 76.2 kg (168 lb)   11/07/24 70.7 kg (155 lb 12.8 oz)   07/11/23 69.8 kg (153 lb 14.1 oz)     Physical Exam    Physical Exam  Vitals reviewed.   Constitutional:       General: She is not in acute distress.     Appearance: She is well-developed.   HENT:      Head: Normocephalic and atraumatic.   Eyes:      General: No scleral icterus.     Conjunctiva/sclera: Conjunctivae normal. "   Cardiovascular:      Pulses: no weak pulses.           Dorsalis pedis pulses are 2+ on the right side and 2+ on the left side.   Pulmonary:      Effort: Pulmonary effort is normal.   Feet:      Right foot:      Skin integrity: No ulcer, skin breakdown, erythema, warmth, callus or dry skin.      Left foot:      Skin integrity: No ulcer, skin breakdown, erythema, warmth, callus or dry skin.   Neurological:      Mental Status: She is alert.   Psychiatric:         Attention and Perception: Attention normal.     Patient's shoes and socks removed.    Right Foot/Ankle   Right Foot Inspection  Skin Exam: skin normal and skin intact. No dry skin, no warmth, no callus, no erythema, no maceration, no abnormal color, no pre-ulcer, no ulcer and no callus.     Toe Exam: ROM and strength within normal limits.     Sensory   Vibration: intact  Monofilament testing: intact    Vascular  The right DP pulse is 2+.     Left Foot/Ankle  Left Foot Inspection  Skin Exam: skin normal and skin intact. No dry skin, no warmth, no erythema, no maceration, normal color, no pre-ulcer, no ulcer and no callus.     Toe Exam: ROM and strength within normal limits.     Sensory   Vibration: intact  Monofilament testing: intact    Vascular  The left DP pulse is 2+.     Assign Risk Category  No deformity present  No loss of protective sensation  No weak pulses  Risk: 0    Results    Labs:   Lab Results   Component Value Date    HGBA1C 5.9 (H) 02/15/2025    HGBA1C 5.5 11/07/2024    HGBA1C 5.5 06/14/2023     Lab Results   Component Value Date    CREATININE 0.85 02/15/2025    CREATININE 0.92 05/11/2024    CREATININE 0.83 06/23/2023    BUN 16 02/15/2025    K 4.0 02/15/2025     02/15/2025    CO2 27 02/15/2025     GFR, Calculated   Date Value Ref Range Status   12/22/2020 84 >60 mL/min/1.73m2 Final     Comment:     mL/min per 1.73 square meters                                            Normal Function or Mild Renal    Disease (if clinically at risk):   >or=60  Moderately Decreased:                30-59  Severely Decreased:                  15-29  Renal Failure:                         <15                                            -American GFR: multiply reported GFR by 1.16    Please note that the eGFR is based on the CKD-EPI calculation, and is not intended to be used for drug dosing.                                            Note: Calculated GFR may not be an accurate indicator of renal function if the patient's renal function is not in a steady state.     eGFRcr   Date Value Ref Range Status   05/11/2024 76 >59 Final     eGFR   Date Value Ref Range Status   02/15/2025 80 ml/min/1.73sq m Final     Lab Results   Component Value Date    HDL 60 02/15/2025    TRIG 139 02/15/2025     Lab Results   Component Value Date    ALT 18 02/15/2025    AST 14 02/15/2025    ALKPHOS 65 02/15/2025     Lab Results   Component Value Date    OZX1VAOVXQQK 1.401 06/23/2023    ACS4EHEEJRNB 0.987 02/06/2021    JXT1DVKMNLOK 1.018 07/15/2016       Patient Instructions   Continue your medications with the following changes:  Transition back to Mounjaro, begin at starting dose  Monitor blood sugars regularly  Contact the office with any severe hypoglycemic or hyperglycemic events  Maintain appropriate diet and physical activity  Follow up in 6 months  Call with any questions, concerns, or refill requests  Obtain labs prior to your next appointment    Discussed with the patient and all questioned fully answered. She will call me if any problems arise.

## 2025-04-28 NOTE — ASSESSMENT & PLAN NOTE
Lab Results   Component Value Date    HGBA1C 5.9 (H) 02/15/2025   Current HbA1c represents acceptable control. Blood sugars unavailable for review though patient reports numbers have been elevated prior to bed.  Continue current regimen with the following adjustments:  Transition Trulicity back to Mounjaro with plan to titrate as tolerated.  Advised to adhere to diabetic diet, and recommended staying active/exercising routinely.  Discussed symptoms and treatment of hypoglycemia.    Recommended routine follow-up with Ophthalmology and foot exams.   Ordered blood work to complete prior to next visit.    Orders:  •  Tirzepatide (Mounjaro) 5 MG/0.5ML SOAJ; Inject 5 mg under the skin once a week Do not start before May 19, 2025.  •  Hemoglobin A1C; Future  •  Comprehensive metabolic panel; Future  •  Albumin / creatinine urine ratio; Future  •  Lipid panel; Future

## 2025-04-28 NOTE — PATIENT INSTRUCTIONS
Continue your medications with the following changes:  Transition back to Mounjaro, begin at starting dose  Monitor blood sugars regularly  Contact the office with any severe hypoglycemic or hyperglycemic events  Maintain appropriate diet and physical activity  Follow up in 6 months  Call with any questions, concerns, or refill requests  Obtain labs prior to your next appointment

## 2025-04-29 RX ORDER — DULAGLUTIDE 1.5 MG/.5ML
INJECTION, SOLUTION SUBCUTANEOUS
Qty: 2 ML | Refills: 5 | OUTPATIENT
Start: 2025-04-29

## 2025-05-20 ENCOUNTER — TELEPHONE (OUTPATIENT)
Dept: ENDOCRINOLOGY | Facility: CLINIC | Age: 50
End: 2025-05-20

## 2025-05-20 NOTE — TELEPHONE ENCOUNTER
Reason for call:   [x] Prior Auth  [] Other:     Caller:  [x] Patient  [] Pharmacy  Name:   Address:   Callback Number:     Medication: Tirzepatide (Mounjaro) 5 MG/0.5ML SOAJ Inject 5 mg under the skin once a week       Ordering Provider:   [] PCP/Provider -   [x] Speciality/Provider - [facility]     Has the patient tried other medications and failed? If failed, which medications did they fail?    [] No   [x] Yes - Trulicity     Is the patient's insurance updated in EPIC?   [x] Yes   [] No   []  Is a copy of the patient's insurance scanned in EPIC?    Yes   [] No

## 2025-05-22 NOTE — TELEPHONE ENCOUNTER
PA for (Mounjaro) 5 MG SUBMITTED to [employer]    via    [x][employer]-Auth ID #  [id number]  NDC # [id number]  [x]PA sent as URGENT    All office notes, labs and other pertaining documents and studies sent. Clinical questions answered. Awaiting determination from insurance company.     Turnaround time for your insurance to make a decision on your Prior Authorization can take 7-21 business days.

## 2025-05-22 NOTE — TELEPHONE ENCOUNTER
PA for (Mounjaro) 5 MG  APPROVED     Date(s) approved 3/22/25-5/21/26    Case # [id number]    Patient advised by          [x]MyChart Message  []Phone call   []LMOM  []L/M to call office as no active Communication consent on file  []Unable to leave detailed message as VM not approved on Communication consent       Pharmacy advised by    [x]Fax  []Phone call  []Secure Chat    Specialty Pharmacy    []      Approval letter scanned into [employer] Yes

## (undated) DEVICE — ENDOPATH 5MM ENDOSCOPIC BLUNT TIP DISSECTORS (12 POUCHES CONTAINING 3 DISSECTORS EACH): Brand: ENDOPATH

## (undated) DEVICE — TRAY FOLEY 16FR URIMETER SURESTEP

## (undated) DEVICE — PREMIUM DRY TRAY LF: Brand: MEDLINE INDUSTRIES, INC.

## (undated) DEVICE — CHLORAPREP HI-LITE 26ML ORANGE

## (undated) DEVICE — POOLE SUCTION HANDLE: Brand: CARDINAL HEALTH

## (undated) DEVICE — LAPAROSCOPIC SMOKE EVAC TUBING

## (undated) DEVICE — AEM CORD

## (undated) DEVICE — [HIGH FLOW INSUFFLATOR,  DO NOT USE IF PACKAGE IS DAMAGED,  KEEP DRY,  KEEP AWAY FROM SUNLIGHT,  PROTECT FROM HEAT AND RADIOACTIVE SOURCES.]: Brand: PNEUMOSURE

## (undated) DEVICE — SCD SEQUENTIAL COMPRESSION COMFORT SLEEVE MEDIUM KNEE LENGTH: Brand: KENDALL SCD

## (undated) DEVICE — REM POLYHESIVE ADULT PATIENT RETURN ELECTRODE: Brand: VALLEYLAB

## (undated) DEVICE — SUT VICRYL 0 UR-6 27 IN J603H

## (undated) DEVICE — CURITY PLAIN PACKING STRIP: Brand: CURITY

## (undated) DEVICE — IRRIG ENDO FLO TUBING

## (undated) DEVICE — BLUE HEAT SCOPE WARMER

## (undated) DEVICE — ENDOPATH XCEL BLADELESS TROCARS WITH STABILITY SLEEVES: Brand: ENDOPATH XCEL

## (undated) DEVICE — MAYO STAND COVER: Brand: CONVERTORS

## (undated) DEVICE — UNIVERSAL GYN LAPAROSCOPY,KIT: Brand: CARDINAL HEALTH

## (undated) DEVICE — UNDYED BRAIDED (POLYGLACTIN 910), SYNTHETIC ABSORBABLE SUTURE: Brand: COATED VICRYL

## (undated) DEVICE — LAP AEM SCISSOR TIP .75 IN

## (undated) DEVICE — ENDOPATH XCEL UNIVERSAL TROCAR STABLILITY SLEEVES: Brand: ENDOPATH XCEL

## (undated) DEVICE — SUT MONOCRYL 4-0 PS-2 27 IN Y426H

## (undated) DEVICE — INTENDED FOR TISSUE SEPARATION, AND OTHER PROCEDURES THAT REQUIRE A SHARP SURGICAL BLADE TO PUNCTURE OR CUT.: Brand: BARD-PARKER SAFETY BLADES SIZE 11, STERILE

## (undated) DEVICE — SYRINGE 50ML LL

## (undated) DEVICE — GLOVE INDICATOR PI UNDERGLOVE SZ 6.5 BLUE

## (undated) DEVICE — SUT VICRYL 0 CT-1 36 IN J946H

## (undated) DEVICE — TUBING SUCTION 5MM X 12 FT

## (undated) DEVICE — BLUNT TIP LAPAROSCOPIC SEALER/DIVIDER: Brand: LIGASURE

## (undated) DEVICE — GLOVE SRG BIOGEL 6.5

## (undated) DEVICE — UTERINE MANIPULATOR RUMI 6.7 X 8 CM

## (undated) DEVICE — ADHESIVE SKN CLSR HISTOACRYL FLEX 0.5ML LF

## (undated) DEVICE — 3000CC GUARDIAN II: Brand: GUARDIAN